# Patient Record
Sex: MALE | Race: WHITE | Employment: FULL TIME | URBAN - METROPOLITAN AREA
[De-identification: names, ages, dates, MRNs, and addresses within clinical notes are randomized per-mention and may not be internally consistent; named-entity substitution may affect disease eponyms.]

---

## 2023-08-30 ENCOUNTER — OFFICE VISIT (OUTPATIENT)
Age: 53
End: 2023-08-30

## 2023-08-30 VITALS — HEIGHT: 63 IN | WEIGHT: 165 LBS | BODY MASS INDEX: 29.23 KG/M2

## 2023-08-30 DIAGNOSIS — M25.512 ACUTE PAIN OF LEFT SHOULDER: Primary | ICD-10-CM

## 2023-08-30 RX ORDER — ACETAMINOPHEN AND CODEINE PHOSPHATE 300; 30 MG/1; MG/1
1-2 TABLET ORAL EVERY 12 HOURS PRN
Qty: 30 TABLET | Refills: 0 | Status: SHIPPED | OUTPATIENT
Start: 2023-08-30 | End: 2023-09-14

## 2023-08-30 RX ORDER — METHYLPREDNISOLONE ACETATE 80 MG/ML
80 INJECTION, SUSPENSION INTRA-ARTICULAR; INTRALESIONAL; INTRAMUSCULAR; SOFT TISSUE ONCE
Status: COMPLETED | OUTPATIENT
Start: 2023-08-30 | End: 2023-08-30

## 2023-08-30 RX ADMIN — METHYLPREDNISOLONE ACETATE 80 MG: 80 INJECTION, SUSPENSION INTRA-ARTICULAR; INTRALESIONAL; INTRAMUSCULAR; SOFT TISSUE at 13:43

## 2023-08-30 NOTE — PROGRESS NOTES
[facility]  [facility] SPECIALTY [facility], [facility] AND SPORTS MEDICINE  [facility]  [place] [address]     Chief Compliant:  No chief complaint on file. History of Present Illness: This is a 48 y.o. male who presents to the clinic today for evaluation / follow up of left shoulder pain. He did this acutely at work when he slipped on Monday and got his shoulder hyperextended back and he felt a pop and a crunch. He had immediate significant pain inability to raise the arm up. He did go to the emergency room where they had x-rays taken that did not show any acute process as far as a fracture or dislocation. He has been placed in a sling and is here today for further evaluation. He had no previous left shoulder problems. .       Physical Exam:    On exam he can raise arm up to about 90 degrees of forward elevation he is very painful through this arc of motion passively I really cannot take it much further as it is very painful his skin is otherwise intact he does have some evidence of a proximal biceps rupture with some Luis deformity of the biceps. No ecchymosis here cap of less than 2 seconds sensation intact light touch. Nursing note and vitals reviewed. Labs and Imaging: X-rays of the left shoulder were reviewed did not show any acute process no dislocation he does have some cysts at the greater tuberosity no other bony lesions no fracture    XR taken today:  No results found. No orders of the defined types were placed in this encounter. Assessment and Plan:  1. Acute pain of left shoulder            This is a 48 y.o. male with left shoulder pain. I do suspect that he does have a left shoulder rotator cuff tear. This reason I like to submit a C9 for a left shoulder MRI. I also asked him in a C9 for a left shoulder steroid injection which we will give today due to his significant pain.   I

## 2023-08-31 ENCOUNTER — TELEPHONE (OUTPATIENT)
Age: 53
End: 2023-08-31

## 2023-08-31 DIAGNOSIS — M25.512 ACUTE PAIN OF LEFT SHOULDER: Primary | ICD-10-CM

## 2023-08-31 NOTE — TELEPHONE ENCOUNTER
Patients wife called stating that Bianca Gerber just saw a worker's comp  today and the  is requesting that the MRI be done under the health insurance as soon as possible.

## 2023-09-01 ENCOUNTER — HOSPITAL ENCOUNTER (OUTPATIENT)
Dept: PHYSICAL THERAPY | Facility: CLINIC | Age: 53
Setting detail: THERAPIES SERIES
Discharge: HOME | End: 2023-09-01

## 2023-09-01 NOTE — FLOWSHEET NOTE
[] [address] &  Therapy  [address] [facility].    [phone]  F: [phone]   [x] [address] [facility]  [address]   [address]  P: [phone]  F: [phone]  [] [address]  [address] [facility]  P: [phone]  F: [phone] [] David Camille  P: [phone]  F: [phone]  [] 224 [facility]  [facility]   [address]   [place]: [phone]  F: [phone]   [] 97 [facility]  [address] [facility] [address]  [place]: [phone]   F: [phone]     Physical Therapy Cancel/No Show note    Date: 2023  Patient: Gerhardt Howells  : 1970  MRN: [id number]    Cancels/No Shows to date: 0- cancellation not counted against patient     For today's appointment patient:    [x]  Cancelled    [] Rescheduled appointment    [] No-show     Reason given by patient:    []  Patient ill    []  Conflicting appointment    [] No transportation      [] Conflict with work    [] No reason given    [] Weather related    [] COVID-19    [x] Other:      Comments:  awaiting C-9 approval for [facility], will be rescheduled when authorization approved      [] Next appointment was confirmed    Electronically signed by: Roque Hooker PT

## 2023-09-11 ENCOUNTER — HOSPITAL ENCOUNTER (OUTPATIENT)
Dept: MRI IMAGING | Age: 53
Discharge: HOME | End: 2023-09-13
Attending: ORTHOPAEDIC SURGERY
Payer: COMMERCIAL

## 2023-09-11 DIAGNOSIS — M25.512 ACUTE PAIN OF LEFT SHOULDER: ICD-10-CM

## 2023-09-11 PROCEDURE — 73221 MRI JOINT UPR EXTREM W/O DYE: CPT

## 2023-09-13 ENCOUNTER — TELEPHONE (OUTPATIENT)
Age: 53
End: 2023-09-13

## 2023-09-13 NOTE — TELEPHONE ENCOUNTER
Please call the patients wife Coreen Balderrama regarding Katina Marie MRI . He had it done on Monday at [address] look at the MRI and advise to wether he needs to come back in for an appointment and if he should start therapy. I will need to submit a C-9 for the therapy. We have been waiting on the start of the claim.

## 2023-09-20 ENCOUNTER — OFFICE VISIT (OUTPATIENT)
Age: 53
End: 2023-09-20
Payer: COMMERCIAL

## 2023-09-20 VITALS — HEIGHT: 63 IN | WEIGHT: 165 LBS | BODY MASS INDEX: 29.23 KG/M2

## 2023-09-20 DIAGNOSIS — M75.22 BICIPITAL TENDINITIS OF LEFT SHOULDER: Primary | ICD-10-CM

## 2023-09-20 DIAGNOSIS — S46.012A TRAUMATIC COMPLETE TEAR OF LEFT ROTATOR CUFF, INITIAL ENCOUNTER: ICD-10-CM

## 2023-09-20 PROCEDURE — 99214 OFFICE O/P EST MOD 30 MIN: CPT | Performed by: ORTHOPAEDIC SURGERY

## 2023-09-20 NOTE — PROGRESS NOTES
[address]  [place] [address]  Dept: [phone]  Dept Fax: [phone]    Chief Compliant:  Chief Complaint   Patient presents with    Shoulder Injury     Left shoulder MRI discussion        History of Present Illness: This is a 48 y.o. male who presents to the clinic today for evaluation / follow up of left shoulder MRI. He states he still has continued pain and significant weakness in the arm he really cannot even use a dinner utensil to elevate his arm. .       Physical Exam:    On examination today he can eventually get the arm above his head but he has really no strength against rotator cuff resistance he has pain against resistance as well    Nursing note and vitals reviewed. Labs and Imaging:   MRI of the left shoulder shows that he has a full-thickness retracted rotator cuff tear of supraspinatus and infraspinatus and some biceps tendon tearing and tendinitis. On the sagittal T1 image it looks like he still has excellent muscle bulk with no significant atrophy glenohumeral joint still looks to be preserved  XR taken today:  No results found. No orders of the defined types were placed in this encounter. Assessment and Plan:  No diagnosis found. This is a 48 y.o. male with left shoulder large rotator cuff tear of supraspinatus and infraspinatus as well as biceps tendon tear and tendinitis. I discussed with him that I do think that this does need to be fixed surgically. I discussed with him left shoulder arthroscopy with biceps tenotomy and open rotator cuff repair subacromial decompression. I did discuss with him the potential risks of surgery including infection continued weakness and pain shoulder stiffness and the possibility that I may not be able to get the rotator cuff completely repaired.   I do think that this is

## 2023-10-09 DIAGNOSIS — Z01.818 PRE-OP EXAM: Primary | ICD-10-CM

## 2023-10-11 ENCOUNTER — HOSPITAL ENCOUNTER (OUTPATIENT)
Age: 53
Discharge: HOME | End: 2023-10-11
Payer: COMMERCIAL

## 2023-10-11 DIAGNOSIS — Z01.818 PRE-OP EXAM: ICD-10-CM

## 2023-10-11 LAB
ANION GAP SERPL CALCULATED.3IONS-SCNC: 9 MMOL/L (ref 9–17)
BASOPHILS # BLD: 0.06 K/UL (ref 0–0.2)
BASOPHILS NFR BLD: 1 % (ref 0–2)
BUN SERPL-MCNC: 12 MG/DL (ref 6–20)
CALCIUM SERPL-MCNC: 9.5 MG/DL (ref 8.6–10.4)
CHLORIDE SERPL-SCNC: 100 MMOL/L (ref 98–107)
CO2 SERPL-SCNC: 29 MMOL/L (ref 20–31)
CREAT SERPL-MCNC: 0.8 MG/DL (ref 0.7–1.2)
EOSINOPHIL # BLD: 0.16 K/UL (ref 0–0.44)
EOSINOPHILS RELATIVE PERCENT: 3 % (ref 1–4)
ERYTHROCYTE [DISTWIDTH] IN BLOOD BY AUTOMATED COUNT: 12.4 % (ref 11.8–14.4)
GFR SERPL CREATININE-BSD FRML MDRD: >60 ML/MIN/1.73M2
GLUCOSE SERPL-MCNC: 96 MG/DL (ref 70–99)
HCT VFR BLD AUTO: 43 % (ref 40.7–50.3)
HGB BLD-MCNC: 14.7 G/DL (ref 13–17)
IMM GRANULOCYTES # BLD AUTO: 0.08 K/UL (ref 0–0.3)
IMM GRANULOCYTES NFR BLD: 1 %
LYMPHOCYTES NFR BLD: 1.62 K/UL (ref 1.1–3.7)
LYMPHOCYTES RELATIVE PERCENT: 26 % (ref 24–43)
MCH RBC QN AUTO: 32.2 PG (ref 25.2–33.5)
MCHC RBC AUTO-ENTMCNC: 34.2 G/DL (ref 28.4–34.8)
MCV RBC AUTO: 94.1 FL (ref 82.6–102.9)
MONOCYTES NFR BLD: 0.63 K/UL (ref 0.1–1.2)
MONOCYTES NFR BLD: 10 % (ref 3–12)
NEUTROPHILS NFR BLD: 59 % (ref 36–65)
NEUTS SEG NFR BLD: 3.68 K/UL (ref 1.5–8.1)
NRBC BLD-RTO: 0 PER 100 WBC
PLATELET # BLD AUTO: 230 K/UL (ref 138–453)
PMV BLD AUTO: 9 FL (ref 8.1–13.5)
POTASSIUM SERPL-SCNC: 4.9 MMOL/L (ref 3.7–5.3)
RBC # BLD AUTO: 4.57 M/UL (ref 4.21–5.77)
SODIUM SERPL-SCNC: 138 MMOL/L (ref 135–144)
WBC OTHER # BLD: 6.2 K/UL (ref 3.5–11.3)

## 2023-10-11 PROCEDURE — 85025 COMPLETE CBC W/AUTO DIFF WBC: CPT

## 2023-10-11 PROCEDURE — 36415 COLL VENOUS BLD VENIPUNCTURE: CPT

## 2023-10-11 PROCEDURE — 93005 ELECTROCARDIOGRAM TRACING: CPT | Performed by: ORTHOPAEDIC SURGERY

## 2023-10-11 PROCEDURE — 80048 BASIC METABOLIC PNL TOTAL CA: CPT

## 2023-10-12 ENCOUNTER — TELEPHONE (OUTPATIENT)
Age: 53
End: 2023-10-12

## 2023-10-12 LAB
EKG ATRIAL RATE: 72 BPM
EKG P AXIS: 29 DEGREES
EKG P-R INTERVAL: 134 MS
EKG Q-T INTERVAL: 370 MS
EKG QRS DURATION: 104 MS
EKG QTC CALCULATION (BAZETT): 405 MS
EKG R AXIS: 9 DEGREES
EKG T AXIS: 24 DEGREES
EKG VENTRICULAR RATE: 72 BPM

## 2023-10-12 NOTE — TELEPHONE ENCOUNTER
Since Nitza Arroyo surgery was cancelled until he has cardiac clearance can he do physical therapy on the shoulder? His wife was questioning if he should do this until surgery is approved through a cardiologist.  They just want to know if it would be beneficial until he is cleared for surgery to show that he is doing something for the shoulder. This is a workers comp claim. He already has physical therapy approval but since the surgery was cancelled can he do the therapy. Can someone please check with Dr Mansoor Simmons today if the patient can do therapy. Any questions just ask me.

## 2023-10-25 DIAGNOSIS — S46.012A TRAUMATIC COMPLETE TEAR OF LEFT ROTATOR CUFF, INITIAL ENCOUNTER: Primary | ICD-10-CM

## 2023-10-25 RX ORDER — ACETAMINOPHEN AND CODEINE PHOSPHATE 300; 30 MG/1; MG/1
1-2 TABLET ORAL
Qty: 30 TABLET | Refills: 0 | Status: SHIPPED | OUTPATIENT
Start: 2023-10-25 | End: 2023-11-08

## 2023-11-15 RX ORDER — AMLODIPINE BESYLATE 5 MG/1
5 TABLET ORAL EVERY MORNING
COMMUNITY
Start: 2023-10-16

## 2023-11-17 ENCOUNTER — HOSPITAL ENCOUNTER (OUTPATIENT)
Age: 53
Setting detail: OUTPATIENT SURGERY
Discharge: HOME | End: 2023-11-17
Attending: ORTHOPAEDIC SURGERY | Admitting: ORTHOPAEDIC SURGERY
Payer: COMMERCIAL

## 2023-11-17 ENCOUNTER — ANESTHESIA EVENT (OUTPATIENT)
Dept: OPERATING ROOM | Age: 53
End: 2023-11-17
Payer: COMMERCIAL

## 2023-11-17 ENCOUNTER — ANESTHESIA (OUTPATIENT)
Dept: OPERATING ROOM | Age: 53
End: 2023-11-17
Payer: COMMERCIAL

## 2023-11-17 VITALS
RESPIRATION RATE: 12 BRPM | WEIGHT: 186 LBS | BODY MASS INDEX: 34.23 KG/M2 | DIASTOLIC BLOOD PRESSURE: 98 MMHG | TEMPERATURE: 97.2 F | HEART RATE: 110 BPM | HEIGHT: 62 IN | SYSTOLIC BLOOD PRESSURE: 147 MMHG | OXYGEN SATURATION: 95 %

## 2023-11-17 DIAGNOSIS — G89.18 POST-OP PAIN: Primary | ICD-10-CM

## 2023-11-17 LAB
BUN BLD-MCNC: 15 MG/DL (ref 8–26)
CA-I BLD-SCNC: 1.16 MMOL/L (ref 1.15–1.33)
CHLORIDE BLD-SCNC: 107 MMOL/L (ref 98–107)
EGFR, POC: >60 ML/MIN/1.73M2
GLUCOSE BLD-MCNC: 97 MG/DL (ref 74–100)
POC CREATININE: 0.6 MG/DL (ref 0.51–1.19)
POTASSIUM BLD-SCNC: 4.3 MMOL/L (ref 3.5–4.5)
SODIUM BLD-SCNC: 141 MMOL/L (ref 138–146)

## 2023-11-17 PROCEDURE — 84132 ASSAY OF SERUM POTASSIUM: CPT

## 2023-11-17 PROCEDURE — 23410 REPAIR ROTATOR CUFF ACUTE: CPT | Performed by: ORTHOPAEDIC SURGERY

## 2023-11-17 PROCEDURE — 3700000000 HC ANESTHESIA ATTENDED CARE: Performed by: ORTHOPAEDIC SURGERY

## 2023-11-17 PROCEDURE — 2709999900 HC NON-CHARGEABLE SUPPLY: Performed by: ORTHOPAEDIC SURGERY

## 2023-11-17 PROCEDURE — 82330 ASSAY OF CALCIUM: CPT

## 2023-11-17 PROCEDURE — 64415 NJX AA&/STRD BRCH PLXS IMG: CPT | Performed by: ANESTHESIOLOGY

## 2023-11-17 PROCEDURE — 6360000002 HC RX W HCPCS: Performed by: NURSE ANESTHETIST, CERTIFIED REGISTERED

## 2023-11-17 PROCEDURE — 6360000002 HC RX W HCPCS: Performed by: ANESTHESIOLOGY

## 2023-11-17 PROCEDURE — 7100000010 HC PHASE II RECOVERY - FIRST 15 MIN: Performed by: ORTHOPAEDIC SURGERY

## 2023-11-17 PROCEDURE — 2720000010 HC SURG SUPPLY STERILE: Performed by: ORTHOPAEDIC SURGERY

## 2023-11-17 PROCEDURE — 3700000001 HC ADD 15 MINUTES (ANESTHESIA): Performed by: ORTHOPAEDIC SURGERY

## 2023-11-17 PROCEDURE — 82947 ASSAY GLUCOSE BLOOD QUANT: CPT

## 2023-11-17 PROCEDURE — 3600000015 HC SURGERY LEVEL 5 ADDTL 15MIN: Performed by: ORTHOPAEDIC SURGERY

## 2023-11-17 PROCEDURE — 6360000002 HC RX W HCPCS: Performed by: ORTHOPAEDIC SURGERY

## 2023-11-17 PROCEDURE — 2500000003 HC RX 250 WO HCPCS: Performed by: NURSE ANESTHETIST, CERTIFIED REGISTERED

## 2023-11-17 PROCEDURE — 82435 ASSAY OF BLOOD CHLORIDE: CPT

## 2023-11-17 PROCEDURE — 2580000003 HC RX 258: Performed by: ORTHOPAEDIC SURGERY

## 2023-11-17 PROCEDURE — 2580000003 HC RX 258: Performed by: NURSE ANESTHETIST, CERTIFIED REGISTERED

## 2023-11-17 PROCEDURE — 84295 ASSAY OF SERUM SODIUM: CPT

## 2023-11-17 PROCEDURE — 6370000000 HC RX 637 (ALT 250 FOR IP): Performed by: ANESTHESIOLOGY

## 2023-11-17 PROCEDURE — 3600000005 HC SURGERY LEVEL 5 BASE: Performed by: ORTHOPAEDIC SURGERY

## 2023-11-17 PROCEDURE — 7100000001 HC PACU RECOVERY - ADDTL 15 MIN: Performed by: ORTHOPAEDIC SURGERY

## 2023-11-17 PROCEDURE — 7100000000 HC PACU RECOVERY - FIRST 15 MIN: Performed by: ORTHOPAEDIC SURGERY

## 2023-11-17 PROCEDURE — 82565 ASSAY OF CREATININE: CPT

## 2023-11-17 PROCEDURE — 84520 ASSAY OF UREA NITROGEN: CPT

## 2023-11-17 RX ORDER — ONDANSETRON 4 MG/1
8 TABLET, FILM COATED ORAL EVERY 8 HOURS PRN
Qty: 12 TABLET | Refills: 0 | Status: SHIPPED | OUTPATIENT
Start: 2023-11-17

## 2023-11-17 RX ORDER — ONDANSETRON 2 MG/ML
INJECTION INTRAMUSCULAR; INTRAVENOUS PRN
Status: DISCONTINUED | OUTPATIENT
Start: 2023-11-17 | End: 2023-11-17 | Stop reason: SDUPTHER

## 2023-11-17 RX ORDER — LIDOCAINE HYDROCHLORIDE 10 MG/ML
1 INJECTION, SOLUTION INFILTRATION; PERINEURAL
Status: DISCONTINUED | OUTPATIENT
Start: 2023-11-17 | End: 2023-11-17 | Stop reason: HOSPADM

## 2023-11-17 RX ORDER — MIDAZOLAM HYDROCHLORIDE 2 MG/2ML
1 INJECTION, SOLUTION INTRAMUSCULAR; INTRAVENOUS EVERY 10 MIN PRN
Status: DISCONTINUED | OUTPATIENT
Start: 2023-11-17 | End: 2023-11-17 | Stop reason: HOSPADM

## 2023-11-17 RX ORDER — BUPIVACAINE HYDROCHLORIDE 5 MG/ML
30 INJECTION, SOLUTION EPIDURAL; INTRACAUDAL ONCE
Status: COMPLETED | OUTPATIENT
Start: 2023-11-17 | End: 2023-11-17

## 2023-11-17 RX ORDER — SODIUM CHLORIDE, SODIUM LACTATE, POTASSIUM CHLORIDE, CALCIUM CHLORIDE 600; 310; 30; 20 MG/100ML; MG/100ML; MG/100ML; MG/100ML
INJECTION, SOLUTION INTRAVENOUS CONTINUOUS PRN
Status: DISCONTINUED | OUTPATIENT
Start: 2023-11-17 | End: 2023-11-17 | Stop reason: SDUPTHER

## 2023-11-17 RX ORDER — FENTANYL CITRATE 50 UG/ML
25 INJECTION, SOLUTION INTRAMUSCULAR; INTRAVENOUS EVERY 5 MIN PRN
Status: COMPLETED | OUTPATIENT
Start: 2023-11-17 | End: 2023-11-17

## 2023-11-17 RX ORDER — IPRATROPIUM BROMIDE AND ALBUTEROL SULFATE 2.5; .5 MG/3ML; MG/3ML
1 SOLUTION RESPIRATORY (INHALATION) ONCE
Status: COMPLETED | OUTPATIENT
Start: 2023-11-17 | End: 2023-11-17

## 2023-11-17 RX ORDER — IBUPROFEN 800 MG/1
800 TABLET ORAL EVERY 8 HOURS PRN
Qty: 90 TABLET | Refills: 0 | Status: SHIPPED | OUTPATIENT
Start: 2023-11-17

## 2023-11-17 RX ORDER — SODIUM CHLORIDE 0.9 % (FLUSH) 0.9 %
5-40 SYRINGE (ML) INJECTION EVERY 12 HOURS SCHEDULED
Status: DISCONTINUED | OUTPATIENT
Start: 2023-11-17 | End: 2023-11-17 | Stop reason: HOSPADM

## 2023-11-17 RX ORDER — ROCURONIUM BROMIDE 10 MG/ML
INJECTION, SOLUTION INTRAVENOUS PRN
Status: DISCONTINUED | OUTPATIENT
Start: 2023-11-17 | End: 2023-11-17 | Stop reason: SDUPTHER

## 2023-11-17 RX ORDER — KETOROLAC TROMETHAMINE 30 MG/ML
30 INJECTION, SOLUTION INTRAMUSCULAR; INTRAVENOUS ONCE
Status: COMPLETED | OUTPATIENT
Start: 2023-11-17 | End: 2023-11-17

## 2023-11-17 RX ORDER — FENTANYL CITRATE 50 UG/ML
50 INJECTION, SOLUTION INTRAMUSCULAR; INTRAVENOUS EVERY 5 MIN PRN
Status: COMPLETED | OUTPATIENT
Start: 2023-11-17 | End: 2023-11-17

## 2023-11-17 RX ORDER — SODIUM CHLORIDE 9 MG/ML
INJECTION, SOLUTION INTRAVENOUS PRN
Status: DISCONTINUED | OUTPATIENT
Start: 2023-11-17 | End: 2023-11-17 | Stop reason: HOSPADM

## 2023-11-17 RX ORDER — MIDAZOLAM HYDROCHLORIDE 2 MG/2ML
2 INJECTION, SOLUTION INTRAMUSCULAR; INTRAVENOUS ONCE
Status: COMPLETED | OUTPATIENT
Start: 2023-11-17 | End: 2023-11-17

## 2023-11-17 RX ORDER — MEPERIDINE HYDROCHLORIDE 50 MG/ML
12.5 INJECTION INTRAMUSCULAR; INTRAVENOUS; SUBCUTANEOUS EVERY 5 MIN PRN
Status: DISCONTINUED | OUTPATIENT
Start: 2023-11-17 | End: 2023-11-17 | Stop reason: HOSPADM

## 2023-11-17 RX ORDER — FENTANYL CITRATE 50 UG/ML
INJECTION, SOLUTION INTRAMUSCULAR; INTRAVENOUS PRN
Status: DISCONTINUED | OUTPATIENT
Start: 2023-11-17 | End: 2023-11-17 | Stop reason: SDUPTHER

## 2023-11-17 RX ORDER — FENTANYL CITRATE 50 UG/ML
50 INJECTION, SOLUTION INTRAMUSCULAR; INTRAVENOUS ONCE
Status: COMPLETED | OUTPATIENT
Start: 2023-11-17 | End: 2023-11-17

## 2023-11-17 RX ORDER — OXYCODONE HYDROCHLORIDE AND ACETAMINOPHEN 5; 325 MG/1; MG/1
1 TABLET ORAL
Status: COMPLETED | OUTPATIENT
Start: 2023-11-17 | End: 2023-11-17

## 2023-11-17 RX ORDER — ONDANSETRON 2 MG/ML
4 INJECTION INTRAMUSCULAR; INTRAVENOUS
Status: DISCONTINUED | OUTPATIENT
Start: 2023-11-17 | End: 2023-11-17 | Stop reason: HOSPADM

## 2023-11-17 RX ORDER — PROPOFOL 10 MG/ML
INJECTION, EMULSION INTRAVENOUS PRN
Status: DISCONTINUED | OUTPATIENT
Start: 2023-11-17 | End: 2023-11-17 | Stop reason: SDUPTHER

## 2023-11-17 RX ORDER — BUPIVACAINE HYDROCHLORIDE 5 MG/ML
INJECTION, SOLUTION EPIDURAL; INTRACAUDAL
Status: COMPLETED | OUTPATIENT
Start: 2023-11-17 | End: 2023-11-17

## 2023-11-17 RX ORDER — OXYCODONE HYDROCHLORIDE AND ACETAMINOPHEN 5; 325 MG/1; MG/1
1-2 TABLET ORAL EVERY 4 HOURS PRN
Qty: 50 TABLET | Refills: 0 | Status: SHIPPED | OUTPATIENT
Start: 2023-11-17 | End: 2023-11-24

## 2023-11-17 RX ORDER — SODIUM CHLORIDE 0.9 % (FLUSH) 0.9 %
5-40 SYRINGE (ML) INJECTION PRN
Status: DISCONTINUED | OUTPATIENT
Start: 2023-11-17 | End: 2023-11-17 | Stop reason: HOSPADM

## 2023-11-17 RX ORDER — CEPHALEXIN 500 MG/1
500 CAPSULE ORAL 4 TIMES DAILY
Qty: 40 CAPSULE | Refills: 0 | Status: SHIPPED | OUTPATIENT
Start: 2023-11-17

## 2023-11-17 RX ORDER — DEXAMETHASONE SODIUM PHOSPHATE 10 MG/ML
INJECTION INTRAMUSCULAR; INTRAVENOUS PRN
Status: DISCONTINUED | OUTPATIENT
Start: 2023-11-17 | End: 2023-11-17 | Stop reason: SDUPTHER

## 2023-11-17 RX ORDER — MAGNESIUM HYDROXIDE 1200 MG/15ML
LIQUID ORAL CONTINUOUS PRN
Status: COMPLETED | OUTPATIENT
Start: 2023-11-17 | End: 2023-11-17

## 2023-11-17 RX ORDER — GLYCOPYRROLATE 0.2 MG/ML
INJECTION INTRAMUSCULAR; INTRAVENOUS PRN
Status: DISCONTINUED | OUTPATIENT
Start: 2023-11-17 | End: 2023-11-17 | Stop reason: SDUPTHER

## 2023-11-17 RX ORDER — LIDOCAINE HYDROCHLORIDE 10 MG/ML
INJECTION, SOLUTION EPIDURAL; INFILTRATION; INTRACAUDAL; PERINEURAL PRN
Status: DISCONTINUED | OUTPATIENT
Start: 2023-11-17 | End: 2023-11-17 | Stop reason: SDUPTHER

## 2023-11-17 RX ADMIN — SODIUM CHLORIDE, POTASSIUM CHLORIDE, SODIUM LACTATE AND CALCIUM CHLORIDE: 600; 310; 30; 20 INJECTION, SOLUTION INTRAVENOUS at 12:24

## 2023-11-17 RX ADMIN — FENTANYL CITRATE 50 MCG: 50 INJECTION, SOLUTION INTRAMUSCULAR; INTRAVENOUS at 15:02

## 2023-11-17 RX ADMIN — BUPIVACAINE HYDROCHLORIDE 30 ML: 5 INJECTION, SOLUTION EPIDURAL; INTRACAUDAL; PERINEURAL at 11:05

## 2023-11-17 RX ADMIN — PROPOFOL 150 MG: 10 INJECTION, EMULSION INTRAVENOUS at 12:30

## 2023-11-17 RX ADMIN — OXYCODONE AND ACETAMINOPHEN 1 TABLET: 5; 325 TABLET ORAL at 16:30

## 2023-11-17 RX ADMIN — FENTANYL CITRATE 100 MCG: 50 INJECTION, SOLUTION INTRAMUSCULAR; INTRAVENOUS at 12:30

## 2023-11-17 RX ADMIN — FENTANYL CITRATE 25 MCG: 50 INJECTION, SOLUTION INTRAMUSCULAR; INTRAVENOUS at 15:35

## 2023-11-17 RX ADMIN — HYDROMORPHONE HYDROCHLORIDE 0.5 MG: 1 INJECTION, SOLUTION INTRAMUSCULAR; INTRAVENOUS; SUBCUTANEOUS at 15:49

## 2023-11-17 RX ADMIN — FENTANYL CITRATE 25 MCG: 50 INJECTION, SOLUTION INTRAMUSCULAR; INTRAVENOUS at 15:40

## 2023-11-17 RX ADMIN — FENTANYL CITRATE 50 MCG: 50 INJECTION, SOLUTION INTRAMUSCULAR; INTRAVENOUS at 14:50

## 2023-11-17 RX ADMIN — ONDANSETRON 4 MG: 2 INJECTION INTRAMUSCULAR; INTRAVENOUS at 14:14

## 2023-11-17 RX ADMIN — SUGAMMADEX 200 MG: 100 INJECTION, SOLUTION INTRAVENOUS at 14:35

## 2023-11-17 RX ADMIN — BUPIVACAINE HYDROCHLORIDE 150 MG: 5 INJECTION, SOLUTION EPIDURAL; INTRACAUDAL; PERINEURAL at 11:05

## 2023-11-17 RX ADMIN — Medication 2000 MG: at 12:39

## 2023-11-17 RX ADMIN — FENTANYL CITRATE 50 MCG: 50 INJECTION, SOLUTION INTRAMUSCULAR; INTRAVENOUS at 15:07

## 2023-11-17 RX ADMIN — FENTANYL CITRATE 50 MCG: 50 INJECTION INTRAMUSCULAR; INTRAVENOUS at 11:05

## 2023-11-17 RX ADMIN — GLYCOPYRROLATE 0.2 MG: 0.2 INJECTION INTRAMUSCULAR; INTRAVENOUS at 13:02

## 2023-11-17 RX ADMIN — HYDROMORPHONE HYDROCHLORIDE 0.5 MG: 1 INJECTION, SOLUTION INTRAMUSCULAR; INTRAVENOUS; SUBCUTANEOUS at 15:54

## 2023-11-17 RX ADMIN — IPRATROPIUM BROMIDE AND ALBUTEROL SULFATE 1 DOSE: 2.5; .5 SOLUTION RESPIRATORY (INHALATION) at 17:03

## 2023-11-17 RX ADMIN — KETOROLAC TROMETHAMINE 30 MG: 30 INJECTION INTRAMUSCULAR; INTRAVENOUS at 16:25

## 2023-11-17 RX ADMIN — DEXAMETHASONE SODIUM PHOSPHATE 10 MG: 10 INJECTION INTRAMUSCULAR; INTRAVENOUS at 13:12

## 2023-11-17 RX ADMIN — FENTANYL CITRATE 50 MCG: 50 INJECTION, SOLUTION INTRAMUSCULAR; INTRAVENOUS at 13:17

## 2023-11-17 RX ADMIN — ROCURONIUM BROMIDE 50 MG: 10 INJECTION, SOLUTION INTRAVENOUS at 12:30

## 2023-11-17 RX ADMIN — MIDAZOLAM HYDROCHLORIDE 2 MG: 1 INJECTION, SOLUTION INTRAMUSCULAR; INTRAVENOUS at 11:05

## 2023-11-17 RX ADMIN — LIDOCAINE HYDROCHLORIDE 50 MG: 10 INJECTION, SOLUTION EPIDURAL; INFILTRATION; INTRACAUDAL; PERINEURAL at 12:30

## 2023-11-17 ASSESSMENT — PAIN DESCRIPTION - ORIENTATION
ORIENTATION: LEFT

## 2023-11-17 ASSESSMENT — PAIN SCALES - GENERAL
PAINLEVEL_OUTOF10: 7
PAINLEVEL_OUTOF10: 10
PAINLEVEL_OUTOF10: 10
PAINLEVEL_OUTOF10: 8
PAINLEVEL_OUTOF10: 10
PAINLEVEL_OUTOF10: 6
PAINLEVEL_OUTOF10: 6
PAINLEVEL_OUTOF10: 7

## 2023-11-17 ASSESSMENT — PAIN DESCRIPTION - LOCATION
LOCATION: SHOULDER

## 2023-11-17 ASSESSMENT — PAIN - FUNCTIONAL ASSESSMENT: PAIN_FUNCTIONAL_ASSESSMENT: 0-10

## 2023-11-17 ASSESSMENT — PAIN DESCRIPTION - DESCRIPTORS: DESCRIPTORS: ACHING;DISCOMFORT;DULL

## 2023-11-17 NOTE — H&P
extremities. No varicosities to bilateral lower extremities. Left shoulder limited ROM at shoulder, tenderness. NEUROLOGIC: CN II-XII are grossly intact. Gait not assessed. IMPRESSIONS:   Traumatic incomplete tear of left rotator cuff, initial encounter. PLANS:   SHOULDER ARTHROSCOPY, BICEPS TENOTOMY, OPEN SUBACROMIAL DECOMPRESSION AND ROTATOR CUFF REPAIR  (PRE-OP REGIONAL BLOCK) - Left. Notified RN that patient's weight today is likely incorrect, states she will correct this.      YOGESH Bonner - CNP   Electronically signed 11/17/2023 at 11:23 AM

## 2023-11-17 NOTE — ANESTHESIA PROCEDURE NOTES
Peripheral Block    Patient location during procedure: pre-op  Reason for block: post-op pain management and at surgeon's request  Start time: 11/17/2023 11:05 AM  End time: 11/17/2023 11:08 AM  Staffing  Performed: anesthesiologist   Anesthesiologist: Richy Golden MD  Performed by: Richy Golden MD  Authorized by: Richy Golden MD    Preanesthetic Checklist  Completed: patient identified, IV checked, site marked, risks and benefits discussed, surgical/procedural consents, equipment checked, pre-op evaluation, timeout performed, anesthesia consent given, oxygen available and monitors applied/VS acknowledged  Peripheral Block   Patient position: sitting  Prep: ChloraPrep  Provider prep: mask and sterile gloves  Patient monitoring: cardiac monitor, continuous pulse ox, frequent blood pressure checks and IV access  Block type: Brachial plexus  Interscalene  Laterality: left  Injection technique: single-shot  Guidance: ultrasound guided  Local infiltration: lidocaine  Infiltration strength: 1 %  Local infiltration: lidocaine  Dose: 3 mL    Needle   Needle type: short-bevel   Needle gauge: 21 G  Needle localization: ultrasound guidance  Needle insertion depth: 2.5 cm  Catheter type: open end  Test dose: negative  Needle length: 10 cm  Assessment   Injection assessment: negative aspiration for heme, no paresthesia on injection and local visualized surrounding nerve on ultrasound  Paresthesia pain: none  Slow fractionated injection: yes  Hemodynamics: stable  Real-time US image taken/store: yes  Outcomes: uncomplicated and patient tolerated procedure well    Additional Notes  U/S 41782. (1) Under ultrasound guidance, a  gauge needle was inserted and placed in close proximity to the nerve. (2) Ultrasound was also used to visualize the spread of the anesthetic in close proximity to the nerve being blocked.  (3) The nerve appeared anatomically normal, and (4 there were no apparent abnormal pathological findings on

## 2023-11-17 NOTE — PROGRESS NOTES
Order for ancef 2gm IV once for surgical prophylaxis, patient weight > 120kg - order modified to ancef 3gm

## 2023-11-17 NOTE — OP NOTE
Operative Note      Patient: Tommy Elise  YOB: 1970  MRN: [id number]    Date of Procedure: 11/17/2023    Pre-Op Diagnosis Codes:     * Traumatic incomplete tear of left rotator cuff, initial encounter [S46.012A]    Post-Op Diagnosis:  Massive rotator cuff tear of supraspinatus and infraspinatus       Procedure(s):  SHOULDER ARTHROSCOPY, OPEN SUBACROMIAL DECOMPRESSION AND MASSIVE ROTATOR CUFF TEAR REPAIR    Surgeon(s):  Sakshi Phillips MD    Assistant:   Resident: Laina Ortiz MD    Anesthesia: General    Estimated Blood Loss (mL): less than 50     Complications: None    Specimens:   * No specimens in log *    Implants:  * No implants in log *      Drains: * No LDAs found *    Findings: Massive rotator cuff tear of supraspinatus and infraspinatus        Detailed Description of Procedure: This is a 63-year-old male who has left shoulder rotator cuff tear. We discussed with him the risks and benefits of this procedure and he has like go ahead with this today. Patient was brought to the operating room placed in the supine position and received general anesthesia. He was then placed in a beachchair position with all bony prominences well-padded and head well secured. His left upper extremity was then prepped and draped sterile fashion. Timeout was performed ensuring correct patient correct extremity and correct procedure. Preoperative antibiotics were assured with 2 g of Ancef. I started off by making a posterior portal into the shoulder. Performed a diagnostic arthroscopy. He had a massive rotator cuff tear of supraspinatus and infraspinatus. Anteriorly the subscap had some superior fraying partial cuff tear but otherwise well intact. His biceps tendon had already been ruptured. The glenohumeral joint still looks to have good cartilage probably grade 2 some areas of grade I chondromalacia.   I made an anterior working portal and performed a labral debridement as he did have some fraying

## 2023-11-21 ENCOUNTER — TELEPHONE (OUTPATIENT)
Age: 53
End: 2023-11-21

## 2023-11-21 NOTE — TELEPHONE ENCOUNTER
Patient called with complaints of hiccups following his recent surgery. Patient gets a little relief when eating popsicles . Patient was asked to call pcp per Dr Chung Mcdonnell.  Patient voices understanding

## 2023-11-28 ENCOUNTER — OFFICE VISIT (OUTPATIENT)
Age: 53
End: 2023-11-28

## 2023-11-28 VITALS — WEIGHT: 186 LBS | HEIGHT: 62 IN | BODY MASS INDEX: 34.23 KG/M2

## 2023-11-28 DIAGNOSIS — M75.22 BICIPITAL TENDINITIS OF LEFT SHOULDER: ICD-10-CM

## 2023-11-28 DIAGNOSIS — S46.012A TRAUMATIC COMPLETE TEAR OF LEFT ROTATOR CUFF, INITIAL ENCOUNTER: Primary | ICD-10-CM

## 2023-11-28 DIAGNOSIS — M25.512 ACUTE PAIN OF LEFT SHOULDER: ICD-10-CM

## 2023-11-28 PROCEDURE — 99024 POSTOP FOLLOW-UP VISIT: CPT | Performed by: ORTHOPAEDIC SURGERY

## 2023-11-28 RX ORDER — ACETAMINOPHEN AND CODEINE PHOSPHATE 300; 30 MG/1; MG/1
1-2 TABLET ORAL EVERY 6 HOURS PRN
Qty: 50 TABLET | Refills: 0 | Status: SHIPPED | OUTPATIENT
Start: 2023-11-28 | End: 2023-12-11

## 2023-11-28 NOTE — PROGRESS NOTES
[address] [facility] SURGERY  [place]  [address] [facility] [address]  [phone]     Surgery:    11/17/2023  Shoulder Arthroscopy, Open Subacromial Decompression And Massive Rotator Cuff Tear Repair - Left    History of Present Illness: This is a 48 y.o. male who presents to the clinic today for post op follow up for Shoulder Arthroscopy, Open Subacromial Decompression And Massive Rotator Cuff Tear Repair - Left on 11/17/2023 . Doing well postoperative. He came off the Percocet he had some adverse reactions to it. At this point his pain has been well-controlled with Tylenol 3. On examination portal sites are clean dry and intact and incision is healing nicely. The passive active bring him up to about 100 degrees of forward elevation but he has painful the arc of motion. He had a very large rotator cuff tear massive cuff tear. At this point we will get him into therapy but passive range of motion only. Otherwise sling. I will see him back in 4 weeks. I did submit a C9 for postoperative therapy if is not already been done. Will give him another refill on his Tylenol 3. Also at the time of surgery were able to get a nice repair of a very mobile massive cuff tear which indicates that this was not chronic but in fact was acute given the mobility of the rotator cuff and also lack of muscle atrophy on the MRI.           Electronically signed by Hazel Servin MD on 11/28/2023 at 12:43 PM

## 2023-12-07 DIAGNOSIS — S46.012A TRAUMATIC COMPLETE TEAR OF LEFT ROTATOR CUFF, INITIAL ENCOUNTER: ICD-10-CM

## 2023-12-07 RX ORDER — ACETAMINOPHEN AND CODEINE PHOSPHATE 300; 30 MG/1; MG/1
1-2 TABLET ORAL EVERY 8 HOURS PRN
Qty: 42 TABLET | Refills: 0 | Status: SHIPPED | OUTPATIENT
Start: 2023-12-07 | End: 2023-12-20

## 2024-01-03 ENCOUNTER — OFFICE VISIT (OUTPATIENT)
Age: 54
End: 2024-01-03

## 2024-01-03 VITALS — WEIGHT: 186 LBS | BODY MASS INDEX: 34.23 KG/M2 | HEIGHT: 62 IN

## 2024-01-03 DIAGNOSIS — S46.012A TRAUMATIC COMPLETE TEAR OF LEFT ROTATOR CUFF, INITIAL ENCOUNTER: Primary | ICD-10-CM

## 2024-01-03 DIAGNOSIS — M75.22 BICIPITAL TENDINITIS OF LEFT SHOULDER: ICD-10-CM

## 2024-01-03 PROCEDURE — 99024 POSTOP FOLLOW-UP VISIT: CPT | Performed by: ORTHOPAEDIC SURGERY

## 2024-01-03 NOTE — PROGRESS NOTES
[facility] Orthopedics & Sports Medicine      [facility] PHYSICIANS [facility], [facility]  [facility] ORTHOPAEDICS AND SPORTS MEDICINE  [address]  [place] [address]     Surgery:    11/17/2023  Shoulder Arthroscopy, Open Subacromial Decompression And Massive Rotator Cuff Tear Repair - Left    History of Present Illness:    This is a 53 y.o. male who presents to the clinic today for post op follow up for Shoulder Arthroscopy, Open Subacromial Decompression And Massive Rotator Cuff Tear Repair - Left on 11/17/2023 .  Doing well postoperatively.    On examination he passively can bring the arm up fully above his head.  Actively he has just very little range of motion though we have not started any of this yet.  Incisions are well-healed.    He states his pain is much less than it was prior to surgery.  At this point want to advance his physical therapy I am okay with active range of motion.  I will keep him off work for the next 2 months and I like to see him back in 6 weeks.          Electronically signed by John Chavez MD on 1/3/2024 at 9:15 AM

## 2024-01-12 ENCOUNTER — TELEPHONE (OUTPATIENT)
Age: 54
End: 2024-01-12

## 2024-01-12 DIAGNOSIS — Z98.890 S/P LEFT ROTATOR CUFF REPAIR: Primary | ICD-10-CM

## 2024-02-14 ENCOUNTER — OFFICE VISIT (OUTPATIENT)
Age: 54
End: 2024-02-14

## 2024-02-14 VITALS — WEIGHT: 186 LBS | BODY MASS INDEX: 34.23 KG/M2 | HEIGHT: 62 IN

## 2024-02-14 DIAGNOSIS — S46.012A TRAUMATIC COMPLETE TEAR OF LEFT ROTATOR CUFF, INITIAL ENCOUNTER: Primary | ICD-10-CM

## 2024-02-14 PROCEDURE — 99024 POSTOP FOLLOW-UP VISIT: CPT | Performed by: ORTHOPAEDIC SURGERY

## 2024-02-14 NOTE — PROGRESS NOTES
[facility] Orthopedics & Sports Medicine      [facility] PHYSICIANS [facility], [facility]  [facility] ORTHOPAEDICS AND SPORTS MEDICINE  [address]  [place] [address]     Surgery:    11/17/2023  Shoulder Arthroscopy, Open Subacromial Decompression And Massive Rotator Cuff Tear Repair - Left    History of Present Illness:    This is a 53 y.o. male who presents to the clinic today for post op follow up for Shoulder Arthroscopy, Open Subacromial Decompression And Massive Rotator Cuff Tear Repair - Left on 11/17/2023 .  He is doing well postoperatively.  He had a lot of delay in his therapy.  He is really just started active range of motion about a month ago but then doing therapy was interrupted because he got COVID for the last 2 weeks.  Otherwise he is doing well he is certainly much better than he was preoperatively.    On examination he can actively raise the arm fully above his head.    When to continue with therapy at this point I do anticipate he will be off work for another 2 to 3 months.  I think he does need to continue therapy.  This is a massive rotator cuff tear.  I will see him back in 6 weeks to check his progress          Electronically signed by John Chavez MD on 2/14/2024 at 12:35 PM

## 2024-02-21 DIAGNOSIS — M79.671 BILATERAL FOOT PAIN: Primary | ICD-10-CM

## 2024-02-21 DIAGNOSIS — M79.672 BILATERAL FOOT PAIN: Primary | ICD-10-CM

## 2024-03-27 ENCOUNTER — OFFICE VISIT (OUTPATIENT)
Age: 54
End: 2024-03-27

## 2024-03-27 VITALS — HEIGHT: 62 IN | WEIGHT: 186 LBS | BODY MASS INDEX: 34.23 KG/M2

## 2024-03-27 DIAGNOSIS — S46.012A TRAUMATIC COMPLETE TEAR OF LEFT ROTATOR CUFF, INITIAL ENCOUNTER: Primary | ICD-10-CM

## 2024-03-27 DIAGNOSIS — M75.22 BICIPITAL TENDINITIS OF LEFT SHOULDER: ICD-10-CM

## 2024-03-27 NOTE — PROGRESS NOTES
[facility] Orthopedics & Sports Medicine      [facility] PHYSICIANS [facility], [facility]  [facility] ORTHOPAEDICS AND SPORTS MEDICINE  [address]  [place] [address]     Surgery:    11/17/2023  Shoulder Arthroscopy, Open Subacromial Decompression And Massive Rotator Cuff Tear Repair - Left    History of Present Illness:    This is a 53 y.o. male who presents to the clinic today for post op follow up for Shoulder Arthroscopy, Open Subacromial Decompression And Massive Rotator Cuff Tear Repair - Left on 11/17/2023 .  Patient about 4 months out at this point.  Has been taking Tylenol for the pain.  He still undergoing physical therapy..  Just recently started strengthening and is using 1 pound weights.  He has also been doing at home exercises.  Does state his range of motion is much better than it was before but he just lacks strength at this point.  He does state that he is getting better though.  He presents today for further evaluation.    Physical exam:  Left shoulder: Patient is able to forward flex his left shoulder to about 180 degrees.  He is a little bit of discomfort throughout range of motion.  He is able to reach behind his head.  He has 3-5 strength resisted forward flexion.  Sensation intact to light touch.    Plan:  Patient is about 4 months postop from a massive rotator cuff repair.  Would like him to continue physical therapy to work on overall strength.  Will also write him a note for work for no use of the left arm for 10 weeks.  He is continue to do at home exercises to help with range of motion as well.  We did recommend that he uses over-the-door pulley as well to help warm up the shoulder.  We would like to see him back in 8 weeks to check overall progress and strength.  I would like to put in a C9 for physical therapy for the left shoulder to 3 times a week for 8 weeks.  The patient demonstrates understanding and is agreeable to plan.    Attending Physician

## 2024-05-29 ENCOUNTER — OFFICE VISIT (OUTPATIENT)
Age: 54
End: 2024-05-29

## 2024-05-29 VITALS — BODY MASS INDEX: 34.23 KG/M2 | HEIGHT: 62 IN | WEIGHT: 186 LBS

## 2024-05-29 DIAGNOSIS — S46.012A TRAUMATIC COMPLETE TEAR OF LEFT ROTATOR CUFF, INITIAL ENCOUNTER: Primary | ICD-10-CM

## 2024-05-29 DIAGNOSIS — M75.22 BICIPITAL TENDINITIS OF LEFT SHOULDER: ICD-10-CM

## 2024-05-29 NOTE — PROGRESS NOTES
[facility] Orthopedics & Sports Medicine      [facility] PHYSIC[facility]ANS [facility]  [facility] ORTHOPAEDICS AND SPORTS MEDICINE  [address]  [place] [address]  Dept: [phone]  Dept Fax: [phone]    Chief Compliant:  Chief Complaint   Patient presents with    Follow-up     L shoulder Sx        History of Present Illness:  This is a 53 y.o. male who presents to the clinic today for evaluation / follow up of left shoulder massive rotator cuff repair.  He is doing well at this point.  He is about 6 to 7 months out from surgery.    .       Physical Exam:    On examination he can raise the arm fully above his head with excellent mechanics.  No scapular dyskinesia.  Against rotator cuff resistance he still only probably 3+ out of 5 strength.  No real pain with this just weakness.    Nursing note and vitals reviewed.     Labs and Imaging:     XR taken today:  No results found.      No orders of the defined types were placed in this encounter.      Assessment and Plan:  1. Traumatic complete tear of left rotator cuff, initial encounter    2. Bicipital tendinitis of left shoulder          This is a 53 y.o. male with I discussed with him at this point I do not think that he is at maximal medical improvement.  Our therapy was delayed because of Workmen's Comp.  At this point I think he needs to continue with strengthening.  I like to see him back in 2 months for further evaluation after he had a chance to do more strengthening with therapy.  He is actually making excellent improvements given his massive cuff tear.  He is not ready for heavy labor with this arm.  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  Will continue his restrictions over the next 2 months.  I will see him back in 2 months to check his progress.  I do think he will continue to make more improvements with

## 2024-07-17 ENCOUNTER — OFFICE VISIT (OUTPATIENT)
Age: 54
End: 2024-07-17

## 2024-07-17 VITALS — WEIGHT: 186 LBS | BODY MASS INDEX: 34.23 KG/M2 | HEIGHT: 62 IN

## 2024-07-17 DIAGNOSIS — S46.012A TRAUMATIC COMPLETE TEAR OF LEFT ROTATOR CUFF, INITIAL ENCOUNTER: Primary | ICD-10-CM

## 2024-07-17 NOTE — PROGRESS NOTES
[facility] Orthopedics & Sports Medicine      [facility] PHYSICIANS [facility]  [facility] ORTHOPAEDICS AND SPORTS MEDICINE  [address]  [place] [address]  Dept: [phone]  Dept Fax: [phone]    Chief Compliant:  Chief Complaint   Patient presents with    Follow-up     L shoulder Sx, 2mo        History of Present Illness:  This is a 54 y.o. male who presents to the clinic today for evaluation / follow up of left shoulder massive rotator cuff repair.  This point he states that he really has made a lot of gains in therapy with strengthening.  He is doing very well..       Physical Exam:    On examination he has full shoulder range of motion with active excellent scapular kinetics.  He has 4+ out of 5 rotator cuff strength.  No instability in the shoulder.  No pain through range of motion no instability    Nursing note and vitals reviewed.     Labs and Imaging:     XR taken today:  No results found.      No orders of the defined types were placed in this encounter.      Assessment and Plan:  1. Traumatic complete tear of left rotator cuff, initial encounter          This is a 54 y.o. male with left shoulder massive rotator cuff repair.  He is doing excellent at this point.  We discussed returning to work on August 1.  We can see him back as needed.         Review of Systems   Constitutional: Negative for fever, chills, sweats.   Neurological: Negative numbness, or weakness.   Integumentary: Negative for rash, itching, laceration, or abrasion.   Musculoskeletal: Positive for Follow-up (L shoulder Sx, 2mo)           Past History:    Current Outpatient Medications:     cephALEXin (KEFLEX) 500 MG capsule, Take 1 capsule by mouth 4 times daily, Disp: 40 capsule, Rfl: 0    ondansetron (ZOFRAN) 4 MG tablet, Take 2 tablets by mouth every 8 hours as needed for Nausea or Vomiting, Disp: 12 tablet, Rfl: 0    ibuprofen (ADVIL;MOTRIN) 800 MG tablet, Take 1 tablet by mouth every 8

## 2024-07-24 ENCOUNTER — OFFICE VISIT (OUTPATIENT)
Age: 54
End: 2024-07-24

## 2024-07-24 DIAGNOSIS — Z09 S/P ORTHOPEDIC SURGERY, FOLLOW-UP EXAM: Primary | ICD-10-CM

## 2024-07-24 NOTE — PROGRESS NOTES
[facility] Orthopedics & Sports Medicine    [facility] Orthopaedics and Sports Medicine  [address] [place] [address], [address]  [place] [address]    Surgery:    11/17/2023  Shoulder Arthroscopy, Open Subacromial Decompression And Massive Rotator Cuff Tear Repair - Left    History of Present Illness:    This is a 54 y.o. male who presents to the clinic today for post op follow up for Shoulder Arthroscopy, Open Subacromial Decompression And Massive Rotator Cuff Tear Repair - Left on 11/17/2023 .  He is here today just to go over some work restrictions as he would like to enter vocational rehab.  We discussed no overhead work, no lifting greater than 30 pounds occasionally, and no climbing.  I think he be a good candidate for vocational rehab as he is very motivated to get back to employment.  We can see him back in 3 months with Nkechi Wells for further evaluation.    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

## 2024-07-29 DIAGNOSIS — Z98.890 S/P LEFT ROTATOR CUFF REPAIR: Primary | ICD-10-CM

## 2024-10-16 ENCOUNTER — OFFICE VISIT (OUTPATIENT)
Age: 54
End: 2024-10-16

## 2024-10-16 VITALS — BODY MASS INDEX: 34.23 KG/M2 | HEIGHT: 62 IN | WEIGHT: 186 LBS

## 2024-10-16 DIAGNOSIS — Z98.890 S/P LEFT ROTATOR CUFF REPAIR: Primary | ICD-10-CM

## 2024-10-16 NOTE — PROGRESS NOTES
We will also request a C9 for the MRI arthrogram.  I will have him follow-up with Dr. Chavez after results of the MRI.  Patient demonstrates understanding and is agreeable to plan.          Electronically signed by SHAN ANDRADE PA-C on 10/16/2024 at 3:31 PM

## 2024-11-01 ENCOUNTER — HOSPITAL ENCOUNTER (OUTPATIENT)
Dept: INTERVENTIONAL RADIOLOGY/VASCULAR | Age: 54
Discharge: HOME | End: 2024-11-03
Payer: COMMERCIAL

## 2024-11-01 ENCOUNTER — HOSPITAL ENCOUNTER (OUTPATIENT)
Dept: MRI IMAGING | Age: 54
Discharge: HOME | End: 2024-11-03
Payer: COMMERCIAL

## 2024-11-01 DIAGNOSIS — Z98.890 S/P LEFT ROTATOR CUFF REPAIR: ICD-10-CM

## 2024-11-01 PROCEDURE — 88334 PATH CONSLTJ SURG CYTO XM EA: CPT

## 2024-11-01 PROCEDURE — 73222 MRI JOINT UPR EXTREM W/DYE: CPT

## 2024-11-01 PROCEDURE — 88305 TISSUE EXAM BY PATHOLOGIST: CPT

## 2024-11-01 PROCEDURE — 23350 INJECTION FOR SHOULDER X-RAY: CPT

## 2024-11-01 PROCEDURE — 6360000004 HC RX CONTRAST MEDICATION

## 2024-11-01 PROCEDURE — 88333 PATH CONSLTJ SURG CYTO XM 1: CPT

## 2024-11-01 PROCEDURE — 73040 CONTRAST X-RAY OF SHOULDER: CPT

## 2024-11-01 PROCEDURE — 88342 IMHCHEM/IMCYTCHM 1ST ANTB: CPT

## 2024-11-01 PROCEDURE — 88341 IMHCHEM/IMCYTCHM EA ADD ANTB: CPT

## 2024-11-01 PROCEDURE — 77002 NEEDLE LOCALIZATION BY XRAY: CPT

## 2024-11-01 RX ADMIN — IOHEXOL 8 ML: 240 INJECTION, SOLUTION INTRATHECAL; INTRAVASCULAR; INTRAVENOUS; ORAL at 15:30

## 2024-11-01 NOTE — BRIEF OP NOTE
Brief Postoperative Note for Shoulder Arthrogram    Natanael Arroyo  YOB: 1970  [id number]    Pre-operative Diagnosis:  Left Shoulder Pain     Post-operative Diagnosis: Same    Procedure: Fluoroscopy guided shoulder arthrogram     Anesthesia: 1% Lidocaine     Surgeons/Assistants: Octavio Guzman PA-C    Complications: none    EBL: Minimal    Specimens: Were not obtained    Successful left shoulder arthrogram.  8 mL of dilute gadolinium mixture injected.  Dressing applied. MRI to follow.    Electronically signed by KAI Adams on 11/1/2024 at 3:36 PM

## 2024-11-01 NOTE — PROGRESS NOTES
Here for a left shoulder arthrogram. Consent done. Supine on table. Octavio Sigala present. Area prepped, draped and numbed. Access obtained and 8 ml injection completed. Access out and band aid on. Patient to MRI.

## 2024-11-05 LAB — SURGICAL PATHOLOGY REPORT: NORMAL

## 2024-11-12 ENCOUNTER — OFFICE VISIT (OUTPATIENT)
Age: 54
End: 2024-11-12

## 2024-11-12 VITALS — WEIGHT: 186 LBS | HEIGHT: 62 IN | BODY MASS INDEX: 34.23 KG/M2

## 2024-11-12 DIAGNOSIS — M75.112 NONTRAUMATIC INCOMPLETE TEAR OF LEFT ROTATOR CUFF: Primary | ICD-10-CM

## 2024-11-12 NOTE — PROGRESS NOTES
[facility] Orthopedics & Sports Medicine      [facility] PHYSICIANS [facility]  [facility]THOPAED[facility] SPORTS MEDICINE  [address]  [place] [address]  Dept: [phone]  Dept Fax: [phone]    Chief Compliant:  Chief Complaint   Patient presents with    Shoulder Pain     Left shoulder        History of Present Illness:  This is a 54 y.o. male who presents to the clinic today for evaluation / follow up of MRI of the left shoulder.  He states he has probably 4-10 pain in the shoulder.  He is 1 year out from his massive rotator cuff repair.  He is certainly much better than he was preoperatively but again is still continue to have weakness and a dull aching pain in the shoulder.  Certainly has problems with the shoulder standard.  He has a hard time with driving as he states just being in that position for long periods of time seems to aggravate his shoulder..       Physical Exam:    On examination he can raise the arm fully above his head he has probably 4- out of 5 rotator cuff strength against resistance maybe even 4 out of 5.  He has otherwise no effusion he has good scapular kinetics.  Skin is normal over top.    Nursing note and vitals reviewed.     Labs and Imaging:   MRI of the left shoulder arthrogram shows that he has an intact rotator cuff.  He does have thinning of the rotator cuff and my pain could be up to 50% of the rotator cuff making a high-grade partial cuff tear.  On the T1 sagittal images his muscle still looks be well-maintained and the rotator cuff muscles  XR taken today:  No results found.      No orders of the defined types were placed in this encounter.      Assessment and Plan:  No diagnosis found.      This is a 54 y.o. male with left shoulder rotator cuff partial cuff tear.  I discussed with him the overall I am happy that his rotator cuff still is intact however it does look like he could potentially have a high-grade partial cuff tear.  I

## 2024-11-14 LAB — SURGICAL PATHOLOGY REPORT: NORMAL

## 2024-12-18 DIAGNOSIS — Z01.818 PRE-OP EXAM: Primary | ICD-10-CM

## 2024-12-19 ENCOUNTER — PREP FOR PROCEDURE (OUTPATIENT)
Age: 54
End: 2024-12-19

## 2024-12-19 PROBLEM — M75.112 INCOMPLETE TEAR OF LEFT ROTATOR CUFF: Status: ACTIVE | Noted: 2024-12-19

## 2025-01-17 ENCOUNTER — TELEPHONE (OUTPATIENT)
Age: 55
End: 2025-01-17

## 2025-01-17 NOTE — TELEPHONE ENCOUNTER
Patient has been scheduled for sx on 2/10. I called today and spoke to him/her to confirm date/time/location of sx and reminded him/her to get PAT done.  He plans to do this at [facility] next week. I asked that he/she come to the office to sign consent and  instructions. He plans to do this on 1/29 when he brings his wife for an appointment with us.

## 2025-01-24 ENCOUNTER — HOSPITAL ENCOUNTER (OUTPATIENT)
Age: 55
Discharge: HOME | End: 2025-01-24

## 2025-01-26 LAB
EKG ATRIAL RATE: 71 BPM
EKG P AXIS: 38 DEGREES
EKG P-R INTERVAL: 132 MS
EKG Q-T INTERVAL: 372 MS
EKG QRS DURATION: 110 MS
EKG QTC CALCULATION (BAZETT): 404 MS
EKG R AXIS: -5 DEGREES
EKG T AXIS: 31 DEGREES
EKG VENTRICULAR RATE: 71 BPM

## 2025-01-27 ENCOUNTER — HOSPITAL ENCOUNTER (OUTPATIENT)
Age: 55
Discharge: HOME | End: 2025-01-27
Payer: COMMERCIAL

## 2025-01-27 DIAGNOSIS — Z01.818 PRE-OP EXAM: ICD-10-CM

## 2025-01-27 LAB
ANION GAP SERPL CALCULATED.3IONS-SCNC: 10 MMOL/L (ref 9–16)
BASOPHILS # BLD: 0.05 K/UL (ref 0–0.2)
BASOPHILS NFR BLD: 1 % (ref 0–2)
BUN SERPL-MCNC: 11 MG/DL (ref 6–20)
CALCIUM SERPL-MCNC: 9.3 MG/DL (ref 8.6–10.4)
CHLORIDE SERPL-SCNC: 105 MMOL/L (ref 98–107)
CO2 SERPL-SCNC: 26 MMOL/L (ref 20–31)
CREAT SERPL-MCNC: 1 MG/DL (ref 0.7–1.2)
EOSINOPHIL # BLD: 0.18 K/UL (ref 0–0.44)
EOSINOPHILS RELATIVE PERCENT: 3 % (ref 1–4)
ERYTHROCYTE [DISTWIDTH] IN BLOOD BY AUTOMATED COUNT: 12.3 % (ref 11.8–14.4)
GFR, ESTIMATED: 89 ML/MIN/1.73M2
GLUCOSE SERPL-MCNC: 96 MG/DL (ref 74–99)
HCT VFR BLD AUTO: 43.2 % (ref 40.7–50.3)
HGB BLD-MCNC: 14.4 G/DL (ref 13–17)
IMM GRANULOCYTES # BLD AUTO: 0.04 K/UL (ref 0–0.3)
IMM GRANULOCYTES NFR BLD: 1 %
LYMPHOCYTES NFR BLD: 1.91 K/UL (ref 1.1–3.7)
LYMPHOCYTES RELATIVE PERCENT: 31 % (ref 24–43)
MCH RBC QN AUTO: 30.1 PG (ref 25.2–33.5)
MCHC RBC AUTO-ENTMCNC: 33.3 G/DL (ref 28.4–34.8)
MCV RBC AUTO: 90.4 FL (ref 82.6–102.9)
MONOCYTES NFR BLD: 0.56 K/UL (ref 0.1–1.2)
MONOCYTES NFR BLD: 9 % (ref 3–12)
NEUTROPHILS NFR BLD: 55 % (ref 36–65)
NEUTS SEG NFR BLD: 3.47 K/UL (ref 1.5–8.1)
NRBC BLD-RTO: 0 PER 100 WBC
PLATELET # BLD AUTO: 267 K/UL (ref 138–453)
PMV BLD AUTO: 9 FL (ref 8.1–13.5)
POTASSIUM SERPL-SCNC: 4.5 MMOL/L (ref 3.7–5.3)
RBC # BLD AUTO: 4.78 M/UL (ref 4.21–5.77)
SODIUM SERPL-SCNC: 141 MMOL/L (ref 136–145)
WBC OTHER # BLD: 6.2 K/UL (ref 3.5–11.3)

## 2025-01-27 PROCEDURE — 85025 COMPLETE CBC W/AUTO DIFF WBC: CPT

## 2025-01-27 PROCEDURE — 36415 COLL VENOUS BLD VENIPUNCTURE: CPT

## 2025-01-27 PROCEDURE — 80048 BASIC METABOLIC PNL TOTAL CA: CPT

## 2025-01-28 NOTE — H&P
ORTHOPEDIC PATIENT EVALUATION      HPI / Chief Complaint  Natanael Arroyo is a 54 y.o. male who presents for left shoulder rotator cuff tear.    Past Medical History  Natanael  has a past medical history of Abnormal EKG, Family history of anesthesia complication, H/O cardiac catheterization, Hypertension, Rotator cuff tear, left, Wears dentures, and Wellness examination.    Past Surgical History  Natanael  has a past surgical history that includes Wrist surgery (Bilateral); [place] tooth extraction; Colonoscopy; Cardiac surgery (N/A, 10/31/2023); Thumb arthroscopy (Left); Shoulder arthroscopy w/ rotator cuff repair (Left, 11/17/2023); and Shoulder arthroscopy (Left, 11/17/2023).    Current Medications  No current facility-administered medications for this encounter.     Current Outpatient Medications   Medication Sig Dispense Refill    cephALEXin (KEFLEX) 500 MG capsule Take 1 capsule by mouth 4 times daily 40 capsule 0    ondansetron (ZOFRAN) 4 MG tablet Take 2 tablets by mouth every 8 hours as needed for Nausea or Vomiting 12 tablet 0    ibuprofen (ADVIL;MOTRIN) 800 MG tablet Take 1 tablet by mouth every 8 hours as needed for Pain 90 tablet 0    amLODIPine (NORVASC) 5 MG tablet Take 1 tablet by mouth every morning         Allergies  Allergies have been reviewed.  Natanael is allergic to ibuprofen.    Social History  Natanael  reports that he has never smoked. He has never used smokeless tobacco. He reports current alcohol use. He reports that he does not currently use drugs.    Family History  Natanael's family history includes Alcohol Abuse in his father; Colon Cancer in his father; Heart Attack in his father and mother.      Review of Systems   History obtained from the patient.   REVIEW OF SYSTEMS:   Constitution: negative for fever, chills  Musculoskeletal: As noted in the HPI   Neurologic: As noted in the HPI    Physical Exam  There were no vitals taken for this visit.   General Appearance:  No apparent distress  Mental

## 2025-02-06 ENCOUNTER — ANESTHESIA EVENT (OUTPATIENT)
Dept: OPERATING ROOM | Age: 55
End: 2025-02-06
Payer: COMMERCIAL

## 2025-02-06 RX ORDER — TAMSULOSIN HYDROCHLORIDE 0.4 MG/1
0.4 CAPSULE ORAL DAILY
COMMUNITY

## 2025-02-10 ENCOUNTER — ANESTHESIA (OUTPATIENT)
Dept: OPERATING ROOM | Age: 55
End: 2025-02-10
Payer: COMMERCIAL

## 2025-02-10 ENCOUNTER — HOSPITAL ENCOUNTER (OUTPATIENT)
Age: 55
Setting detail: OUTPATIENT SURGERY
Discharge: HOME | End: 2025-02-10
Attending: ORTHOPAEDIC SURGERY | Admitting: ORTHOPAEDIC SURGERY
Payer: COMMERCIAL

## 2025-02-10 VITALS
DIASTOLIC BLOOD PRESSURE: 90 MMHG | WEIGHT: 208 LBS | OXYGEN SATURATION: 95 % | RESPIRATION RATE: 18 BRPM | HEART RATE: 80 BPM | BODY MASS INDEX: 36.86 KG/M2 | SYSTOLIC BLOOD PRESSURE: 134 MMHG | TEMPERATURE: 98.1 F | HEIGHT: 63 IN

## 2025-02-10 DIAGNOSIS — G89.18 POST-OP PAIN: Primary | ICD-10-CM

## 2025-02-10 PROBLEM — S46.012A STRAIN OF MUSCLE(S) AND TENDON(S) OF THE ROTATOR CUFF OF LEFT SHOULDER, INITIAL ENCOUNTER: Status: ACTIVE | Noted: 2025-02-10

## 2025-02-10 PROCEDURE — 3600000014 HC SURGERY LEVEL 4 ADDTL 15MIN: Performed by: ORTHOPAEDIC SURGERY

## 2025-02-10 PROCEDURE — 64415 NJX AA&/STRD BRCH PLXS IMG: CPT | Performed by: ANESTHESIOLOGY

## 2025-02-10 PROCEDURE — 2500000003 HC RX 250 WO HCPCS: Performed by: ORTHOPAEDIC SURGERY

## 2025-02-10 PROCEDURE — 2500000003 HC RX 250 WO HCPCS: Performed by: NURSE ANESTHETIST, CERTIFIED REGISTERED

## 2025-02-10 PROCEDURE — 7100000000 HC PACU RECOVERY - FIRST 15 MIN: Performed by: ORTHOPAEDIC SURGERY

## 2025-02-10 PROCEDURE — C1713 ANCHOR/SCREW BN/BN,TIS/BN: HCPCS | Performed by: ORTHOPAEDIC SURGERY

## 2025-02-10 PROCEDURE — 3600000004 HC SURGERY LEVEL 4 BASE: Performed by: ORTHOPAEDIC SURGERY

## 2025-02-10 PROCEDURE — 2709999900 HC NON-CHARGEABLE SUPPLY: Performed by: ORTHOPAEDIC SURGERY

## 2025-02-10 PROCEDURE — 2580000003 HC RX 258: Performed by: ANESTHESIOLOGY

## 2025-02-10 PROCEDURE — 6360000002 HC RX W HCPCS: Performed by: NURSE ANESTHETIST, CERTIFIED REGISTERED

## 2025-02-10 PROCEDURE — 6360000002 HC RX W HCPCS: Performed by: ORTHOPAEDIC SURGERY

## 2025-02-10 PROCEDURE — 6370000000 HC RX 637 (ALT 250 FOR IP): Performed by: ANESTHESIOLOGY

## 2025-02-10 PROCEDURE — 3700000001 HC ADD 15 MINUTES (ANESTHESIA): Performed by: ORTHOPAEDIC SURGERY

## 2025-02-10 PROCEDURE — 3700000000 HC ANESTHESIA ATTENDED CARE: Performed by: ORTHOPAEDIC SURGERY

## 2025-02-10 PROCEDURE — 7100000010 HC PHASE II RECOVERY - FIRST 15 MIN: Performed by: ORTHOPAEDIC SURGERY

## 2025-02-10 PROCEDURE — 7100000011 HC PHASE II RECOVERY - ADDTL 15 MIN: Performed by: ORTHOPAEDIC SURGERY

## 2025-02-10 PROCEDURE — C1763 CONN TISS, NON-HUMAN: HCPCS | Performed by: ORTHOPAEDIC SURGERY

## 2025-02-10 PROCEDURE — 6370000000 HC RX 637 (ALT 250 FOR IP): Performed by: NURSE ANESTHETIST, CERTIFIED REGISTERED

## 2025-02-10 PROCEDURE — 7100000001 HC PACU RECOVERY - ADDTL 15 MIN: Performed by: ORTHOPAEDIC SURGERY

## 2025-02-10 PROCEDURE — 6360000002 HC RX W HCPCS

## 2025-02-10 PROCEDURE — 6360000002 HC RX W HCPCS: Performed by: ANESTHESIOLOGY

## 2025-02-10 DEVICE — BONE ANCHORS 3 WITH ARTHROSCOPIC DELIVERY SYSTEM ADVANCED
Type: IMPLANTABLE DEVICE | Site: SHOULDER | Status: FUNCTIONAL
Brand: BONE ANCHORS WITH ARTHROSCOPIC DELIVERY SYSTEM - ADVANCED

## 2025-02-10 DEVICE — IMPLANTABLE DEVICE
Type: IMPLANTABLE DEVICE | Site: SHOULDER | Status: FUNCTIONAL
Brand: BIOINDUCTIVE IMPLANT WITH ARTHROSCOPIC DELIVERY SYSTEM - LARGE

## 2025-02-10 DEVICE — IMPLANTABLE DEVICE
Type: IMPLANTABLE DEVICE | Site: SHOULDER | Status: FUNCTIONAL
Brand: ROTATION MEDICAL TENDON STAPLES

## 2025-02-10 DEVICE — IMPLANTABLE DEVICE
Type: IMPLANTABLE DEVICE | Site: SHOULDER | Status: FUNCTIONAL
Brand: BIOINDUCTIVE IMPLANT WITH ARTHROSCOPIC DELIVERY SYSTEM - MEDIUM

## 2025-02-10 RX ORDER — SODIUM CHLORIDE, SODIUM LACTATE, POTASSIUM CHLORIDE, CALCIUM CHLORIDE 600; 310; 30; 20 MG/100ML; MG/100ML; MG/100ML; MG/100ML
INJECTION, SOLUTION INTRAVENOUS CONTINUOUS
Status: DISCONTINUED | OUTPATIENT
Start: 2025-02-10 | End: 2025-02-10 | Stop reason: HOSPADM

## 2025-02-10 RX ORDER — DEXAMETHASONE SODIUM PHOSPHATE 4 MG/ML
INJECTION, SOLUTION INTRA-ARTICULAR; INTRALESIONAL; INTRAMUSCULAR; INTRAVENOUS; SOFT TISSUE
Status: DISCONTINUED | OUTPATIENT
Start: 2025-02-10 | End: 2025-02-10 | Stop reason: SDUPTHER

## 2025-02-10 RX ORDER — PROPOFOL 10 MG/ML
INJECTION, EMULSION INTRAVENOUS
Status: DISCONTINUED | OUTPATIENT
Start: 2025-02-10 | End: 2025-02-10 | Stop reason: SDUPTHER

## 2025-02-10 RX ORDER — MIDAZOLAM HYDROCHLORIDE 2 MG/2ML
2 INJECTION, SOLUTION INTRAMUSCULAR; INTRAVENOUS
Status: COMPLETED | OUTPATIENT
Start: 2025-02-10 | End: 2025-02-10

## 2025-02-10 RX ORDER — CEPHALEXIN 500 MG/1
500 CAPSULE ORAL 2 TIMES DAILY
Qty: 10 CAPSULE | Refills: 0 | Status: SHIPPED | OUTPATIENT
Start: 2025-02-10 | End: 2025-02-15

## 2025-02-10 RX ORDER — MIDAZOLAM HYDROCHLORIDE 1 MG/ML
INJECTION, SOLUTION INTRAMUSCULAR; INTRAVENOUS
Status: COMPLETED
Start: 2025-02-10 | End: 2025-02-10

## 2025-02-10 RX ORDER — NALOXONE HYDROCHLORIDE 0.4 MG/ML
INJECTION, SOLUTION INTRAMUSCULAR; INTRAVENOUS; SUBCUTANEOUS PRN
Status: DISCONTINUED | OUTPATIENT
Start: 2025-02-10 | End: 2025-02-10 | Stop reason: HOSPADM

## 2025-02-10 RX ORDER — SODIUM CHLORIDE 0.9 % (FLUSH) 0.9 %
5-40 SYRINGE (ML) INJECTION EVERY 12 HOURS SCHEDULED
Status: DISCONTINUED | OUTPATIENT
Start: 2025-02-10 | End: 2025-02-10 | Stop reason: HOSPADM

## 2025-02-10 RX ORDER — BUPIVACAINE HYDROCHLORIDE 5 MG/ML
INJECTION, SOLUTION EPIDURAL; INTRACAUDAL
Status: COMPLETED | OUTPATIENT
Start: 2025-02-10 | End: 2025-02-10

## 2025-02-10 RX ORDER — FENTANYL CITRATE 50 UG/ML
INJECTION, SOLUTION INTRAMUSCULAR; INTRAVENOUS
Status: COMPLETED
Start: 2025-02-10 | End: 2025-02-10

## 2025-02-10 RX ORDER — VANCOMYCIN HYDROCHLORIDE 1 G/20ML
INJECTION, POWDER, LYOPHILIZED, FOR SOLUTION INTRAVENOUS PRN
Status: DISCONTINUED | OUTPATIENT
Start: 2025-02-10 | End: 2025-02-10 | Stop reason: ALTCHOICE

## 2025-02-10 RX ORDER — SCOPOLAMINE 1 MG/3D
1 PATCH, EXTENDED RELEASE TRANSDERMAL
Status: DISCONTINUED | OUTPATIENT
Start: 2025-02-10 | End: 2025-02-10 | Stop reason: HOSPADM

## 2025-02-10 RX ORDER — LABETALOL HYDROCHLORIDE 5 MG/ML
10 INJECTION, SOLUTION INTRAVENOUS
Status: DISCONTINUED | OUTPATIENT
Start: 2025-02-10 | End: 2025-02-10 | Stop reason: HOSPADM

## 2025-02-10 RX ORDER — SODIUM CHLORIDE 0.9 % (FLUSH) 0.9 %
5-40 SYRINGE (ML) INJECTION PRN
Status: DISCONTINUED | OUTPATIENT
Start: 2025-02-10 | End: 2025-02-10 | Stop reason: HOSPADM

## 2025-02-10 RX ORDER — MEPERIDINE HYDROCHLORIDE 50 MG/ML
12.5 INJECTION INTRAMUSCULAR; INTRAVENOUS; SUBCUTANEOUS ONCE
Status: DISCONTINUED | OUTPATIENT
Start: 2025-02-10 | End: 2025-02-10 | Stop reason: HOSPADM

## 2025-02-10 RX ORDER — OXYCODONE HYDROCHLORIDE 5 MG/1
5 TABLET ORAL PRN
Status: DISCONTINUED | OUTPATIENT
Start: 2025-02-10 | End: 2025-02-10 | Stop reason: HOSPADM

## 2025-02-10 RX ORDER — HYDRALAZINE HYDROCHLORIDE 20 MG/ML
10 INJECTION INTRAMUSCULAR; INTRAVENOUS
Status: DISCONTINUED | OUTPATIENT
Start: 2025-02-10 | End: 2025-02-10 | Stop reason: HOSPADM

## 2025-02-10 RX ORDER — DIPHENHYDRAMINE HYDROCHLORIDE 50 MG/ML
12.5 INJECTION INTRAMUSCULAR; INTRAVENOUS
Status: DISCONTINUED | OUTPATIENT
Start: 2025-02-10 | End: 2025-02-10 | Stop reason: HOSPADM

## 2025-02-10 RX ORDER — ALBUTEROL SULFATE 90 UG/1
INHALANT RESPIRATORY (INHALATION)
Status: DISCONTINUED | OUTPATIENT
Start: 2025-02-10 | End: 2025-02-10 | Stop reason: SDUPTHER

## 2025-02-10 RX ORDER — MORPHINE SULFATE 2 MG/ML
1 INJECTION, SOLUTION INTRAMUSCULAR; INTRAVENOUS EVERY 5 MIN PRN
Status: DISCONTINUED | OUTPATIENT
Start: 2025-02-10 | End: 2025-02-10 | Stop reason: HOSPADM

## 2025-02-10 RX ORDER — OXYCODONE AND ACETAMINOPHEN 5; 325 MG/1; MG/1
1-2 TABLET ORAL EVERY 6 HOURS PRN
Qty: 30 TABLET | Refills: 0 | Status: SHIPPED | OUTPATIENT
Start: 2025-02-10 | End: 2025-02-17

## 2025-02-10 RX ORDER — SODIUM CHLORIDE 9 MG/ML
INJECTION, SOLUTION INTRAVENOUS PRN
Status: DISCONTINUED | OUTPATIENT
Start: 2025-02-10 | End: 2025-02-10 | Stop reason: HOSPADM

## 2025-02-10 RX ORDER — ONDANSETRON 2 MG/ML
INJECTION INTRAMUSCULAR; INTRAVENOUS
Status: DISCONTINUED | OUTPATIENT
Start: 2025-02-10 | End: 2025-02-10 | Stop reason: SDUPTHER

## 2025-02-10 RX ORDER — ROCURONIUM BROMIDE 10 MG/ML
INJECTION, SOLUTION INTRAVENOUS
Status: DISCONTINUED | OUTPATIENT
Start: 2025-02-10 | End: 2025-02-10 | Stop reason: SDUPTHER

## 2025-02-10 RX ORDER — EPINEPHRINE 1 MG/ML
INJECTION, SOLUTION INTRAMUSCULAR; SUBCUTANEOUS
Status: DISCONTINUED
Start: 2025-02-10 | End: 2025-02-10 | Stop reason: HOSPADM

## 2025-02-10 RX ORDER — METOCLOPRAMIDE HYDROCHLORIDE 5 MG/ML
10 INJECTION INTRAMUSCULAR; INTRAVENOUS
Status: DISCONTINUED | OUTPATIENT
Start: 2025-02-10 | End: 2025-02-10 | Stop reason: HOSPADM

## 2025-02-10 RX ORDER — ONDANSETRON 2 MG/ML
4 INJECTION INTRAMUSCULAR; INTRAVENOUS
Status: DISCONTINUED | OUTPATIENT
Start: 2025-02-10 | End: 2025-02-10 | Stop reason: HOSPADM

## 2025-02-10 RX ORDER — OXYCODONE HYDROCHLORIDE 5 MG/1
10 TABLET ORAL PRN
Status: DISCONTINUED | OUTPATIENT
Start: 2025-02-10 | End: 2025-02-10 | Stop reason: HOSPADM

## 2025-02-10 RX ORDER — LIDOCAINE HYDROCHLORIDE 10 MG/ML
INJECTION, SOLUTION EPIDURAL; INFILTRATION; INTRACAUDAL; PERINEURAL
Status: DISCONTINUED | OUTPATIENT
Start: 2025-02-10 | End: 2025-02-10 | Stop reason: SDUPTHER

## 2025-02-10 RX ORDER — KETOROLAC TROMETHAMINE 30 MG/ML
INJECTION, SOLUTION INTRAMUSCULAR; INTRAVENOUS
Status: DISCONTINUED | OUTPATIENT
Start: 2025-02-10 | End: 2025-02-10 | Stop reason: SDUPTHER

## 2025-02-10 RX ORDER — MIDAZOLAM HYDROCHLORIDE 1 MG/ML
INJECTION, SOLUTION INTRAMUSCULAR; INTRAVENOUS
Status: COMPLETED | OUTPATIENT
Start: 2025-02-10 | End: 2025-02-10

## 2025-02-10 RX ORDER — FENTANYL CITRATE 50 UG/ML
INJECTION, SOLUTION INTRAMUSCULAR; INTRAVENOUS
Status: COMPLETED | OUTPATIENT
Start: 2025-02-10 | End: 2025-02-10

## 2025-02-10 RX ORDER — MIDAZOLAM HYDROCHLORIDE 2 MG/2ML
2 INJECTION, SOLUTION INTRAMUSCULAR; INTRAVENOUS
Status: DISCONTINUED | OUTPATIENT
Start: 2025-02-10 | End: 2025-02-10 | Stop reason: HOSPADM

## 2025-02-10 RX ORDER — BUPIVACAINE HYDROCHLORIDE 2.5 MG/ML
INJECTION, SOLUTION INFILTRATION; PERINEURAL
Status: COMPLETED | OUTPATIENT
Start: 2025-02-10 | End: 2025-02-10

## 2025-02-10 RX ORDER — FENTANYL CITRATE 50 UG/ML
100 INJECTION, SOLUTION INTRAMUSCULAR; INTRAVENOUS
Status: COMPLETED | OUTPATIENT
Start: 2025-02-10 | End: 2025-02-10

## 2025-02-10 RX ORDER — VANCOMYCIN HYDROCHLORIDE 1 G/20ML
INJECTION, POWDER, LYOPHILIZED, FOR SOLUTION INTRAVENOUS
Status: DISCONTINUED
Start: 2025-02-10 | End: 2025-02-10 | Stop reason: HOSPADM

## 2025-02-10 RX ADMIN — SODIUM CHLORIDE, POTASSIUM CHLORIDE, SODIUM LACTATE AND CALCIUM CHLORIDE: 600; 310; 30; 20 INJECTION, SOLUTION INTRAVENOUS at 13:02

## 2025-02-10 RX ADMIN — MIDAZOLAM HYDROCHLORIDE 2 MG: 2 INJECTION, SOLUTION INTRAMUSCULAR; INTRAVENOUS at 11:08

## 2025-02-10 RX ADMIN — MIDAZOLAM 2 MG: 1 INJECTION INTRAMUSCULAR; INTRAVENOUS at 11:13

## 2025-02-10 RX ADMIN — ROCURONIUM BROMIDE 50 MG: 10 INJECTION, SOLUTION INTRAVENOUS at 11:58

## 2025-02-10 RX ADMIN — PROPOFOL 200 MG: 10 INJECTION, EMULSION INTRAVENOUS at 11:58

## 2025-02-10 RX ADMIN — MIDAZOLAM HYDROCHLORIDE 2 MG: 1 INJECTION, SOLUTION INTRAMUSCULAR; INTRAVENOUS at 11:08

## 2025-02-10 RX ADMIN — ALBUTEROL SULFATE 2 PUFF: 90 AEROSOL, METERED RESPIRATORY (INHALATION) at 13:29

## 2025-02-10 RX ADMIN — BUPIVACAINE HYDROCHLORIDE 5 ML: 2.5 INJECTION, SOLUTION INFILTRATION; PERINEURAL at 11:13

## 2025-02-10 RX ADMIN — FENTANYL CITRATE 100 MCG: 50 INJECTION, SOLUTION INTRAMUSCULAR; INTRAVENOUS at 11:08

## 2025-02-10 RX ADMIN — ONDANSETRON 4 MG: 2 INJECTION INTRAMUSCULAR; INTRAVENOUS at 12:11

## 2025-02-10 RX ADMIN — BUPIVACAINE HYDROCHLORIDE 10 ML: 5 INJECTION, SOLUTION EPIDURAL; INTRACAUDAL; PERINEURAL at 11:13

## 2025-02-10 RX ADMIN — Medication 2000 MG: at 12:11

## 2025-02-10 RX ADMIN — ALBUTEROL SULFATE 2 PUFF: 90 AEROSOL, METERED RESPIRATORY (INHALATION) at 13:22

## 2025-02-10 RX ADMIN — DEXAMETHASONE SODIUM PHOSPHATE 4 MG: 4 INJECTION INTRA-ARTICULAR; INTRALESIONAL; INTRAMUSCULAR; INTRAVENOUS; SOFT TISSUE at 12:11

## 2025-02-10 RX ADMIN — SODIUM CHLORIDE, POTASSIUM CHLORIDE, SODIUM LACTATE AND CALCIUM CHLORIDE: 600; 310; 30; 20 INJECTION, SOLUTION INTRAVENOUS at 11:55

## 2025-02-10 RX ADMIN — BUPIVACAINE 10 ML: 13.3 INJECTION, SUSPENSION, LIPOSOMAL INFILTRATION at 11:13

## 2025-02-10 RX ADMIN — KETOROLAC TROMETHAMINE 30 MG: 30 INJECTION, SOLUTION INTRAMUSCULAR at 13:15

## 2025-02-10 RX ADMIN — FENTANYL CITRATE 100 MCG: 50 INJECTION, SOLUTION INTRAMUSCULAR; INTRAVENOUS at 11:13

## 2025-02-10 RX ADMIN — LIDOCAINE HYDROCHLORIDE 50 MG: 10 INJECTION, SOLUTION EPIDURAL; INFILTRATION; INTRACAUDAL; PERINEURAL at 11:58

## 2025-02-10 ASSESSMENT — PAIN SCALES - GENERAL
PAINLEVEL_OUTOF10: 4
PAINLEVEL_OUTOF10: 0

## 2025-02-10 ASSESSMENT — PAIN DESCRIPTION - LOCATION: LOCATION: SHOULDER

## 2025-02-10 ASSESSMENT — PAIN - FUNCTIONAL ASSESSMENT: PAIN_FUNCTIONAL_ASSESSMENT: 0-10

## 2025-02-10 NOTE — PROGRESS NOTES
Spiritual Health History and Assessment/Progress Note  [facility]    (P) Post-Procedural, (P) Emotional distress, (P) Life Adjustments,      Name: Natanael Arroyo MRN: [id number]    Age: 54 y.o.     Sex: male   Language: English   Buddhist: Mandaen   Incomplete tear of left rotator cuff     Date: 2/10/2025            Total Time Calculated: (P) 15 min              Spiritual Assessment began in [facility] OR        Referral/Consult From: (P) Rounding   Encounter Overview/Reason: (P) Post-Procedural  Service Provided For: (P) Patient, Family       visited Pt. In OR post op. Pt. And family were present during visit. Pt. Was awake and alert. Pt. Was open to conversation and welcoming. Family was a Pentec[employer]al family and desired to discuss mary , family and Advent  family.  provided pastoral support and care. Empathic listening was provided and prayer was offered as requested for comfort and strength.    Mary, Belief, Meaning:   Patient has beliefs or practices that help with coping during difficult times  Family/Friends have beliefs or practices that help with coping during difficult times      Importance and Influence:  Patient has spiritual/personal beliefs that influence decisions regarding their health  Family/Friends have spiritual/personal beliefs that influence decisions regarding the patient's health    Community:  Patient feels well-supported. Support system includes: Spouse/Partner and Extended family  Family/Friends feel well-supported. Support system includes: Spouse/Partner, [place] Community, and Extended family    Assessment and Plan of Care:     Patient Interventions include: Facilitated expression of thoughts and feelings, Explored spiritual coping/struggle/distress, and Engaged in theological reflection  Family/Friends Interventions include: Facilitated expression of thoughts and feelings and Explored spiritual coping/struggle/distress    Patient Plan of Care:

## 2025-02-10 NOTE — ANESTHESIA PROCEDURE NOTES
Peripheral Block    Patient location during procedure: pre-op  Reason for block: post-op pain management and at surgeon's request  Start time: 2/10/2025 11:13 AM  End time: 2/10/2025 11:15 AM  Staffing  Performed: anesthesiologist   Anesthesiologist: Mariann Whaley MD  Performed by: Mariann Whaley MD  Authorized by: Mariann Whaley MD    Preanesthetic Checklist  Completed: patient identified, IV checked, site marked, risks and benefits discussed, surgical/procedural consents, equipment checked, pre-op evaluation, timeout performed, anesthesia consent given, oxygen available, monitors applied/VS acknowledged, fire risk safety assessment completed and verbalized and blood product R/B/A discussed and consented  Peripheral Block   Patient position: supine  Prep: ChloraPrep  Provider prep: mask and sterile gloves  Patient monitoring: cardiac monitor, continuous pulse ox, frequent blood pressure checks, IV access, oxygen and responsive to questions  Block type: Brachial plexus  Interscalene  Laterality: left  Injection technique: single-shot  Guidance: ultrasound guided  Local infiltration: bupivacaine  Infiltration strength: 0.25 %  Local infiltration: bupivacaine  Dose: 2 mL    Needle   Needle type: insulated echogenic nerve stimulator needle   Needle gauge: 22 G  Needle localization: anatomical landmarks and ultrasound guidance  Needle insertion depth: 2 cm  Test dose: negative  Needle length: 5 cm  Assessment   Injection assessment: negative aspiration for heme, no paresthesia on injection, local visualized surrounding nerve on ultrasound and no intravascular symptoms  Paresthesia pain: none  Slow fractionated injection: yes  Hemodynamics: stable  Outcomes: uncomplicated and patient tolerated procedure well    Medications Administered  fentaNYL (SUBLIMAZE) injection - IntraVENous   100 mcg - 2/10/2025 11:13:00 AM  midazolam (VERSED) injection 2 mg/2mL - IntraVENous   2 mg - 2/10/2025 11:13:00 AM  BUPivacaine (MARCAINE) PF injection

## 2025-02-10 NOTE — OP NOTE
[facility]  RODRIGUEZ AND NEPH ENDOSCOPY-WD [id number] Left 1 Implanted         Drains: * No LDAs found *    Findings:  Infection Present At Time Of Surgery (PATOS) (choose all levels that have infection present):  No infection present  Other Findings: As above    Detailed Description of Procedure:   This is a 54-year-old male who has left shoulder pain.  We have discussed with him the risks and benefits of this procedure and he has looked ahead with this today.  Patient was brought to the operating room placed in supine position received general anesthesia.  He was then placed in a beachchair position with all bony prominences well-padded and head well secured.  The left upper extremity was then prepped and draped sterile fashion.  Timeout was performed ensuring correct patient correct extremity and correct procedure.  Preoperative biotics were assured with 2 g of Ancef.    I first made a posterior viewing portal in the shoulder for diagnostic arthroscopy.  Here his cartilage to look to be well-preserved.  He had some superior labral fraying.  I made an anterior working portal and gently debrided the labrum.  He did have a suture that was visible from previous rotator cuff repair.  I cut this and then remove this with a grasper.  I then evaluated the subscap anteriorly.  This looked to be still intact with some superior subscap fraying.  I then came superiorly and here he looked to have a high-grade partial cuff tear of both infraspinatus and supraspinatus.  The leading edge of the supraspinatus looked to be particularly thin.  We then withdrew arthroscopy equipment.  I made an incision over the anterolateral border of the acromion coming down through his previous cuff repair incision.  Came down through the deltoid raphae.  I performed a bursectomy.  Here is able to evaluate the rotator cuff from superiorly.  Infraspinatus looked to be intact from the bursal side.  The anterior edge of supraspinatus looks like it may have

## 2025-02-10 NOTE — ANESTHESIA POSTPROCEDURE EVALUATION
Department of Anesthesiology  Postprocedure Note    Patient: Natanael Arroyo  MRN: [id number]  YOB: 1970  Date of evaluation: 2/10/2025    Procedure Summary       Date: 02/10/25 Room / Location: [address]    Anesthesia Start: 1155 Anesthesia Stop: 1338    Procedure: LEFT SHOULDER ARTHROSCOPY ROTATOR CUFF REPAIR AND LABRAL DEBRIDEMENT WITH SMITH AND NEPHEW REGENETEN (Left: Shoulder) Diagnosis:       Incomplete tear of left rotator cuff      (Incomplete tear of left rotator cuff [M75.112])    Surgeons: John Chavez MD Responsible Provider: Mariann Whaley MD    Anesthesia Type: general ASA Status: 2            Anesthesia Type: No value filed.    Clementine Phase I: Clementine Score: 7    Clementine Phase II:      Anesthesia Post Evaluation    Patient location during evaluation: PACU  Patient participation: complete - patient participated  Level of consciousness: awake and alert  Airway patency: patent  Nausea & Vomiting: no nausea and no vomiting  Cardiovascular status: hemodynamically stable  Respiratory status: room air and spontaneous ventilation  Hydration status: euvolemic  Multimodal analgesia pain management approach  Pain management: adequate    No notable events documented.

## 2025-02-10 NOTE — ANESTHESIA PRE PROCEDURE
Department of Anesthesiology  Preprocedure Note       Name:  Natanael Arroyo   Age:  54 y.o.  :  1970                                          MRN:  [id number]         Date:  2/10/2025      Surgeon: Surgeon(s):  John Chavez MD    Procedure: Procedure(s):  LEFT SHOULDER ARTHROSCOPY ROTATOR CUFF REPAIR WITH RODRIGUEZ AND NEPHEW REGENETEN    Medications prior to admission:   Prior to Admission medications    Medication Sig Start Date End Date Taking? Authorizing Provider   tamsulosin (FLOMAX) 0.4 MG capsule Take 1 capsule by mouth daily   Yes ProviderAngi MD   amLODIPine (NORVASC) 5 MG tablet Take 1 tablet by mouth every morning 10/16/23  Yes ProviderAngi MD       Current medications:    Current Facility-Administered Medications   Medication Dose Route Frequency Provider Last Rate Last Admin   • ceFAZolin (ANCEF) 2000 mg in sterile water 20 mL IV syringe  2,000 mg IntraVENous On Call to OR Nkechi Wells PA-C       • sodium chloride flush 0.9 % injection 5-40 mL  5-40 mL IntraVENous 2 times per day Nkechi Wells PA-C       • sodium chloride flush 0.9 % injection 5-40 mL  5-40 mL IntraVENous PRN Nkechi Wells PA-C       • 0.9 % sodium chloride infusion   IntraVENous PRN Nkechi Wells PA-C       • fentaNYL (SUBLIMAZE) injection 100 mcg  100 mcg IntraVENous Once PRN Mariann Whaley MD       • scopolamine (TRANSDERM-SCOP) transdermal patch 1 patch  1 patch TransDERmal Q72H Mariann Whaley MD       • sodium chloride flush 0.9 % injection 5-40 mL  5-40 mL IntraVENous 2 times per day Mariann Whaley MD       • sodium chloride flush 0.9 % injection 5-40 mL  5-40 mL IntraVENous PRN aMriann Whaley MD       • 0.9 % sodium chloride infusion   IntraVENous PRN Mariann Whaley MD       • midazolam PF (VERSED) injection 2 mg  2 mg IntraVENous Once PRN Mariann Whaley MD           Allergies:    Allergies   Allergen Reactions   • Ibuprofen Other (See Comments)     Pt feels like he's drunk  [place] intoxicated       Problem List:    Patient

## 2025-02-10 NOTE — DISCHARGE INSTRUCTIONS
Activity-keep arm in sling at all times.  Okay for elbow range of motion once nerve block wears off.    Dressing-keep dressing clean and intact for 5 days then okay to remove.  Okay to shower on postoperative day 2 with no direct water to dressing site.

## 2025-02-17 ENCOUNTER — TELEPHONE (OUTPATIENT)
Age: 55
End: 2025-02-17

## 2025-02-17 DIAGNOSIS — G89.18 POST-OP PAIN: ICD-10-CM

## 2025-02-17 RX ORDER — OXYCODONE AND ACETAMINOPHEN 5; 325 MG/1; MG/1
1-2 TABLET ORAL EVERY 6 HOURS PRN
Qty: 30 TABLET | Refills: 0 | Status: SHIPPED | OUTPATIENT
Start: 2025-02-17 | End: 2025-02-24

## 2025-02-17 NOTE — TELEPHONE ENCOUNTER
Natanael Gill had surgery last Monday by Dr Chavez. He is out of the Percocet. He would like a refill. He has been taking 4 a day every 6 hours. He tried to stretch it out but it did not work out very well. He has his 1st post op scheduled with Dr Chavez on 02/26/2025. He would like the prescription to be called into [facility] at [facility]. Please call Snow and let her know it has been called in.

## 2025-02-26 ENCOUNTER — OFFICE VISIT (OUTPATIENT)
Age: 55
End: 2025-02-26

## 2025-02-26 VITALS — HEIGHT: 63 IN | WEIGHT: 208 LBS | BODY MASS INDEX: 36.86 KG/M2

## 2025-02-26 DIAGNOSIS — S46.012A TRAUMATIC COMPLETE TEAR OF LEFT ROTATOR CUFF, INITIAL ENCOUNTER: ICD-10-CM

## 2025-02-26 DIAGNOSIS — G89.18 POST-OP PAIN: ICD-10-CM

## 2025-02-26 DIAGNOSIS — M75.112 INCOMPLETE ROTATOR CUFF TEAR OR RUPTURE OF LEFT SHOULDER, NOT SPECIFIED AS TRAUMATIC: Primary | ICD-10-CM

## 2025-02-26 DIAGNOSIS — Z98.890 S/P LEFT ROTATOR CUFF REPAIR: ICD-10-CM

## 2025-02-26 RX ORDER — TRAMADOL HYDROCHLORIDE 50 MG/1
50 TABLET ORAL EVERY 12 HOURS
Qty: 40 TABLET | Refills: 0 | Status: SHIPPED | OUTPATIENT
Start: 2025-02-26 | End: 2025-03-18

## 2025-02-26 NOTE — PROGRESS NOTES
[facility] Orthopedics & Sports Medicine    [facility] Orthopaedics and Sports Medicine  [address] [place] [address], [address]  [place] [address]    Surgery:    2/10/2025  Left Shoulder Arthroscopy Rotator Cuff Repair And Labral Debridement With Smith And Nephew Regeneten - Left    History of Present Illness:    This is a 54 y.o. male who presents to the clinic today for post op follow up for Left Shoulder Arthroscopy Rotator Cuff Repair And Labral Debridement With Smith And Nephew Regeneten - Left on 2/10/2025 .  He is doing well postoperatively.    On exam incisions are all healing nicely no evidence of any infection no erythema no drainage.    At this point he is doing well.  Will start some active assisted range of motion with therapy.  Otherwise sling while he is up moving around.  Will see him back in 4 weeks.          Electronically signed by John Chavez MD on 2/26/2025 at 11:33 AM

## 2025-03-05 ENCOUNTER — TELEPHONE (OUTPATIENT)
Age: 55
End: 2025-03-05

## 2025-03-05 DIAGNOSIS — G89.18 POST-OP PAIN: Primary | ICD-10-CM

## 2025-03-05 NOTE — TELEPHONE ENCOUNTER
Patient/wife called with concerns over patients pain level. PT urged patient to reach out due to his pain level. Patient to be started on Tylenol #3's per DR Chavez

## 2025-03-06 RX ORDER — ACETAMINOPHEN AND CODEINE PHOSPHATE 300; 30 MG/1; MG/1
1-2 TABLET ORAL EVERY 8 HOURS PRN
Qty: 40 TABLET | Refills: 0 | Status: SHIPPED | OUTPATIENT
Start: 2025-03-06 | End: 2025-03-13

## 2025-03-26 ENCOUNTER — OFFICE VISIT (OUTPATIENT)
Age: 55
End: 2025-03-26

## 2025-03-26 VITALS — WEIGHT: 208 LBS | HEIGHT: 63 IN | BODY MASS INDEX: 36.86 KG/M2

## 2025-03-26 DIAGNOSIS — S46.012A TRAUMATIC COMPLETE TEAR OF LEFT ROTATOR CUFF, INITIAL ENCOUNTER: Primary | ICD-10-CM

## 2025-03-26 PROCEDURE — 99024 POSTOP FOLLOW-UP VISIT: CPT | Performed by: ORTHOPAEDIC SURGERY

## 2025-03-26 NOTE — PROGRESS NOTES
[facility] Orthopedics & Sports Medicine    [facility] Orthopaedics and Sports Medicine  [address] [place] [address], [address]  [place] [address]    Surgery:    2/10/2025  Left Shoulder Arthroscopy Rotator Cuff Repair And Labral Debridement With Smith And Nephew Regeneten - Left    History of Present Illness:    This is a 54 y.o. male who presents to the clinic today for post op follow up for Left Shoulder Arthroscopy Rotator Cuff Repair And Labral Debridement With Smith And Nephew Regeneten - Left on 2/10/2025 .  He is doing well.  He is really not starting active range of motion just yet.    Incision is well-healed.  He can forward elevate the shoulder all the way above his head today but there is discomfort with it.    Overall very pleased that he can already raise the arm above his head.  I think he is doing well.  Will give him a prescription to advance his therapy with some active range of motion and gentle strengthening.  Will see him back in approximately 6 weeks.          Electronically signed by John Chavez MD on 3/26/2025 at 12:58 PM

## 2025-05-07 ENCOUNTER — OFFICE VISIT (OUTPATIENT)
Age: 55
End: 2025-05-07

## 2025-05-07 DIAGNOSIS — Z98.890 S/P LEFT ROTATOR CUFF REPAIR: Primary | ICD-10-CM

## 2025-05-07 RX ORDER — METHYLPREDNISOLONE 4 MG/1
TABLET ORAL
Qty: 42 TABLET | Refills: 0 | Status: SHIPPED | OUTPATIENT
Start: 2025-05-07

## 2025-05-07 NOTE — PROGRESS NOTES
[facility] Orthopedics & Sports Medicine    [facility] Orthopaedics and Sports Medicine  [address] [place] [address], [address]  [place] [address]    Surgery:    2/10/2025  Left Shoulder Arthroscopy Rotator Cuff Repair And Labral Debridement With Smith And Nephew Regeneten - Left    History of Present Illness:    This is a 54 y.o. male who presents to the clinic today for post op follow up for Left Shoulder Arthroscopy Rotator Cuff Repair And Labral Debridement With Smith And Nephew Regeneten - Left on 2/10/2025 .  He states he started strengthening maybe a couple weeks ago and has had increasing pain since that time.  Overall his range of motion is come along nicely however he is just having a lot of discomfort with really taken the arm above 90 degrees.    On exam today he does have intact rotator cuff strength out paise 3 out of 5 but does offer some resistance certainly against rotator cuff strength testing.  He is able to bring the arm fully above his head with good scapular dynamics.  He does have impingement as he brings the arm up above 90.    Overall I think he is doing well.  I would like him to continue with therapy.  I like to put a C9 in for continued therapy for this left shoulder.  I am okay with him progressing with his vocational rehab.  I would like to see him back in 6 weeks for reevaluation.  I think like he is healing continue to improve over the next several months.  So calm down some of his discomfort that has had since he started strengthening we will do a 3-week Medrol Dosepak.  Will continue with Aleve as needed.  Will see him back in 6 weeks          Electronically signed by John Chavez MD on 5/7/2025 at 12:22 PM

## 2025-05-22 DIAGNOSIS — S46.012A TRAUMATIC COMPLETE TEAR OF LEFT ROTATOR CUFF, INITIAL ENCOUNTER: Primary | ICD-10-CM

## 2025-06-18 ENCOUNTER — OFFICE VISIT (OUTPATIENT)
Age: 55
End: 2025-06-18

## 2025-06-18 DIAGNOSIS — M75.22 BICIPITAL TENDINITIS, LEFT SHOULDER: ICD-10-CM

## 2025-06-18 DIAGNOSIS — M75.112 INCOMPLETE ROTATR-CUFF TEAR/RUPTR OF L SHOULDER, NOT TRAUMA: Primary | ICD-10-CM

## 2025-06-18 DIAGNOSIS — S46.012A TRAUMATIC COMPLETE TEAR OF LEFT ROTATOR CUFF, INITIAL ENCOUNTER: ICD-10-CM

## 2025-06-18 NOTE — PROGRESS NOTES
[facility] Orthopedics & Sports Medicine    [facility] Orthopaedics and Sports Medicine  [address], [address]  [place] [address]    Surgery:    2/10/2025  Left Shoulder Arthroscopy Rotator Cuff Repair And Labral Debridement With Smith And Nephew Regeneten - Left    History of Present Illness:    This is a 54 y.o. male who presents to the clinic today for post op follow up for Left Shoulder Arthroscopy Rotator Cuff Repair And Labral Debridement With Smith And Nephew Regeneten - Left on 2/10/2025 .  He comes in today stating that he has had dramatic improvement and is feeling much much better.    On exam he can raise the arm fully above his head excellent internal and external rotation.  He has actually good strength against rotator cuff resistance with minimal discomfort.    At this point he is doing very very well.  Like to submit a C9 for active physical therapy and also a C9 for work conditioning and also a C9 for a functional capacity exam after his had his work conditioning.  Will see him back in 3 months with hopefully return to work at that time.          Electronically signed by John Chavez MD on 6/18/2025 at 12:28 PM

## (undated) DEVICE — DRAPE,U/ SHT,SPLIT,PLAS,STERIL: Brand: MEDLINE

## (undated) DEVICE — LIQUIBAND RAPID ADHESIVE 36/CS 0.8ML: Brand: MEDLINE

## (undated) DEVICE — NEPTUNE E-SEP SMOKE EVACUATION PENCIL, COATED, 70MM BLADE, PUSH BUTTON SWITCH: Brand: NEPTUNE E-SEP

## (undated) DEVICE — Device

## (undated) DEVICE — SYRINGE IRRIG 60ML SFT PLIABLE BLB EZ TO GRP 1 HND USE W/

## (undated) DEVICE — STOCKINETTE,IMPERVIOUS,12X48,STERILE: Brand: MEDLINE

## (undated) DEVICE — DRAPE,SHOULDER,BEACH CHAIR,STERILE: Brand: MEDLINE

## (undated) DEVICE — SOLUTION IRRIG 1000ML 0.9% SOD CHL USP POUR PLAS BTL

## (undated) DEVICE — SOLUTION IRRIG 3000ML 0.9% SOD CHL USP UROMATIC PLAS CONT

## (undated) DEVICE — BLADE SHV L13CM DIA4MM EXCALIBUR AGG COOLCUT

## (undated) DEVICE — SUTURE PROL SZ 3-0 L18IN NONABSORBABLE BLU L24MM FS-1 3/8 8684G

## (undated) DEVICE — THE STERILE LIGHT HANDLE COVER IS USED WITH STERIS SURGICAL LIGHTING AND VISUALIZATION SYSTEMS.

## (undated) DEVICE — BANDAGE COBAN 6 IN WND 6INX5YD FOAM

## (undated) DEVICE — YANKAUER,FLEXIBLE HANDLE,REGLR CAPACITY: Brand: MEDLINE INDUSTRIES, INC.

## (undated) DEVICE — VASCULAR CLAMP S SHAPE: Brand: SYMMETRY CLASSIC® DEBAKEY

## (undated) DEVICE — DRAPE,REIN 53X77,STERILE: Brand: MEDLINE

## (undated) DEVICE — ELECTRODE ES L3IN S STL BLDE INSUL DISP VALLEYLAB EDGE

## (undated) DEVICE — GLOVE ORANGE PI 7 1/2   MSG9075

## (undated) DEVICE — SUTURE MCRYL SZ 3-0 L18IN ABSRB UD L19MM PS-2 3/8 CIR PRIM Y497G

## (undated) DEVICE — STERLING XTRASHARP SHAVER GREAT WHITE SHAVER BLADE, 4.2 MM: Brand: STERLING XTRASHARP SHAVER GREAT WHITE

## (undated) DEVICE — ASPIRATOR FLR L72IN SUCT TB W/ 1 DETACH FISH STK PUSH HNDL

## (undated) DEVICE — MHPB ARTHROSCOPY PACK: Brand: MEDLINE INDUSTRIES, INC.

## (undated) DEVICE — POUCH INSTR W6.75XL11.5IN FRST 2 PKT ADH FOR ORTH AND

## (undated) DEVICE — NEEDLE 1/2 CIR SZ 6 SURG MAYO CATGUT

## (undated) DEVICE — PROTECTOR ULN NRV PUR FOAM HK LOOP STRP ANATOMICALLY

## (undated) DEVICE — DRESSING FOAM W4XL4IN AG SIL FACE BORD IONIC ANTIMIC ADH

## (undated) DEVICE — APPLICATOR MEDICATED 26 CC SOLUTION HI LT ORNG CHLORAPREP

## (undated) DEVICE — BLADE SAW W9MM D25MM THK64MM MIC S STL SAG OSC RECIP DISP

## (undated) DEVICE — ELECTRODE PT RET AD L9FT HI MOIST COND ADH HYDRGEL CORDED

## (undated) DEVICE — IMMOBILIZER ORTH CONTACT CLOSURE STRP LG 18X9 IN PROCARE

## (undated) DEVICE — SUTURE ETHBND 2 L30IN NONABSORBABLE GRN WHT LT GRN MO-7 D8793

## (undated) DEVICE — STRAP,POSITIONING,KNEE/BODY,FOAM,4X60": Brand: MEDLINE

## (undated) DEVICE — TUBING PMP L16FT MAIN DISP FOR AR-6400 AR-6475 Â€“ ORDER MULTIPLES OF 10 EACH

## (undated) DEVICE — GLOVE ORANGE PI 8 1/2   MSG9085

## (undated) DEVICE — SYRINGE MARK SCALE W/ LL TIP 3ML 200 S/C

## (undated) DEVICE — GOWN,SIRUS,NONRNF,SETINSLV,XL,20/CS: Brand: MEDLINE

## (undated) DEVICE — GARMENT,MEDLINE,DVT,INT,CALF,MED, GEN2: Brand: MEDLINE

## (undated) DEVICE — PACK PROCEDURE SURG SVMMC SHLDR ARTHRO PK

## (undated) DEVICE — GLOVE SURG SZ 65 L12IN THK75MIL DK GRN LTX FREE

## (undated) DEVICE — T-MAX DISPOSABLE FACE MASK 8 PER BOX

## (undated) DEVICE — BLUNTFILL: Brand: MONOJECT

## (undated) DEVICE — GLOVE ORTHO 7 1/2   MSG9475

## (undated) DEVICE — GLOVE SURG SZ 65 THK91MIL LTX FREE SYN POLYISOPRENE

## (undated) DEVICE — RASP SURG L W0.27XL0.55IN TEAR CRSS CUT MIC RECIP SAW

## (undated) DEVICE — TUBING, SUCTION, 9/32" X 20', STRAIGHT: Brand: MEDLINE INDUSTRIES, INC.

## (undated) DEVICE — SUTURE NDL MAYO CATGUT 824S5

## (undated) DEVICE — GLOVE ORANGE PI 8   MSG9080

## (undated) DEVICE — GLOVE SURG SZ 8 L12IN THK75MIL DK GRN LTX FREE

## (undated) DEVICE — GLOVE SURG SZ 65 L12IN FNGR THK126MIL CRM LTX FREE

## (undated) DEVICE — BLANKET WRM W40.2XL55.9IN IORT LO BODY + MISTRAL AIR

## (undated) DEVICE — CLOTH PRE OP W9XL10.5IN 2% CHG FRAGRANCE RNS FREE READYPREP